# Patient Record
Sex: FEMALE | Race: WHITE | NOT HISPANIC OR LATINO | Employment: OTHER | URBAN - METROPOLITAN AREA
[De-identification: names, ages, dates, MRNs, and addresses within clinical notes are randomized per-mention and may not be internally consistent; named-entity substitution may affect disease eponyms.]

---

## 2018-06-12 ENCOUNTER — OFFICE VISIT CONVERTED (OUTPATIENT)
Dept: NEUROLOGY | Facility: CLINIC | Age: 63
End: 2018-06-12
Attending: PSYCHIATRY & NEUROLOGY

## 2019-10-16 ENCOUNTER — OFFICE VISIT CONVERTED (OUTPATIENT)
Dept: SURGERY | Facility: CLINIC | Age: 64
End: 2019-10-16
Attending: NURSE PRACTITIONER

## 2021-05-15 VITALS — BODY MASS INDEX: 22.26 KG/M2 | WEIGHT: 121 LBS | HEIGHT: 62 IN | RESPIRATION RATE: 12 BRPM

## 2021-05-16 VITALS
HEART RATE: 48 BPM | WEIGHT: 122.5 LBS | DIASTOLIC BLOOD PRESSURE: 51 MMHG | BODY MASS INDEX: 22.54 KG/M2 | HEIGHT: 62 IN | SYSTOLIC BLOOD PRESSURE: 141 MMHG

## 2022-03-10 ENCOUNTER — TRANSCRIBE ORDERS (OUTPATIENT)
Dept: ADMINISTRATIVE | Facility: HOSPITAL | Age: 67
End: 2022-03-10

## 2022-03-10 ENCOUNTER — HOSPITAL ENCOUNTER (OUTPATIENT)
Dept: GENERAL RADIOLOGY | Facility: HOSPITAL | Age: 67
Discharge: HOME | End: 2022-03-10
Admitting: INTERNAL MEDICINE

## 2022-03-10 DIAGNOSIS — M79.643 PAIN OF HAND, UNSPECIFIED LATERALITY: ICD-10-CM

## 2022-03-10 DIAGNOSIS — M25.511 RIGHT SHOULDER PAIN, UNSPECIFIED CHRONICITY: ICD-10-CM

## 2022-03-10 DIAGNOSIS — H20.9 UNSPECIFIED IRIDOCYCLITIS: ICD-10-CM

## 2022-03-10 DIAGNOSIS — H20.9 UNSPECIFIED IRIDOCYCLITIS: Primary | ICD-10-CM

## 2022-03-10 PROCEDURE — 73130 X-RAY EXAM OF HAND: CPT

## 2022-03-10 PROCEDURE — 72202 X-RAY EXAM SI JOINTS 3/> VWS: CPT

## 2022-03-10 PROCEDURE — 73030 X-RAY EXAM OF SHOULDER: CPT

## 2022-07-17 ENCOUNTER — APPOINTMENT (OUTPATIENT)
Dept: GENERAL RADIOLOGY | Facility: HOSPITAL | Age: 67
End: 2022-07-17

## 2022-07-17 ENCOUNTER — HOSPITAL ENCOUNTER (EMERGENCY)
Facility: HOSPITAL | Age: 67
Discharge: HOME | End: 2022-07-17
Attending: EMERGENCY MEDICINE | Admitting: EMERGENCY MEDICINE

## 2022-07-17 VITALS
OXYGEN SATURATION: 96 % | BODY MASS INDEX: 24.67 KG/M2 | HEIGHT: 60 IN | SYSTOLIC BLOOD PRESSURE: 170 MMHG | DIASTOLIC BLOOD PRESSURE: 65 MMHG | HEART RATE: 43 BPM | TEMPERATURE: 97.4 F | WEIGHT: 125.66 LBS | RESPIRATION RATE: 20 BRPM

## 2022-07-17 DIAGNOSIS — U07.1 COVID-19: Primary | ICD-10-CM

## 2022-07-17 DIAGNOSIS — J20.9 ACUTE BRONCHITIS, UNSPECIFIED ORGANISM: ICD-10-CM

## 2022-07-17 LAB
ALBUMIN SERPL-MCNC: 4.5 G/DL (ref 3.5–5.2)
ALBUMIN/GLOB SERPL: 1.5 G/DL
ALP SERPL-CCNC: 79 U/L (ref 39–117)
ALT SERPL W P-5'-P-CCNC: 27 U/L (ref 1–33)
ANION GAP SERPL CALCULATED.3IONS-SCNC: 13.3 MMOL/L (ref 5–15)
ANISOCYTOSIS BLD QL: NORMAL
AST SERPL-CCNC: 25 U/L (ref 1–32)
BASOPHILS # BLD AUTO: 0.02 10*3/MM3 (ref 0–0.2)
BASOPHILS NFR BLD AUTO: 0.2 % (ref 0–1.5)
BILIRUB SERPL-MCNC: 0.2 MG/DL (ref 0–1.2)
BUN SERPL-MCNC: 16 MG/DL (ref 8–23)
BUN/CREAT SERPL: 14.2 (ref 7–25)
CALCIUM SPEC-SCNC: 9.4 MG/DL (ref 8.6–10.5)
CHLORIDE SERPL-SCNC: 99 MMOL/L (ref 98–107)
CO2 SERPL-SCNC: 19.7 MMOL/L (ref 22–29)
CREAT SERPL-MCNC: 1.13 MG/DL (ref 0.57–1)
DEPRECATED RDW RBC AUTO: 54 FL (ref 37–54)
EGFRCR SERPLBLD CKD-EPI 2021: 53.4 ML/MIN/1.73
EOSINOPHIL # BLD AUTO: 0.01 10*3/MM3 (ref 0–0.4)
EOSINOPHIL NFR BLD AUTO: 0.1 % (ref 0.3–6.2)
ERYTHROCYTE [DISTWIDTH] IN BLOOD BY AUTOMATED COUNT: 13.6 % (ref 12.3–15.4)
GLOBULIN UR ELPH-MCNC: 3 GM/DL
GLUCOSE SERPL-MCNC: 111 MG/DL (ref 65–99)
HCT VFR BLD AUTO: 41.6 % (ref 34–46.6)
HGB BLD-MCNC: 13.8 G/DL (ref 12–15.9)
HOLD SPECIMEN: NORMAL
HOLD SPECIMEN: NORMAL
IMM GRANULOCYTES # BLD AUTO: 0.03 10*3/MM3 (ref 0–0.05)
IMM GRANULOCYTES NFR BLD AUTO: 0.3 % (ref 0–0.5)
LYMPHOCYTES # BLD AUTO: 0.89 10*3/MM3 (ref 0.7–3.1)
LYMPHOCYTES NFR BLD AUTO: 9.7 % (ref 19.6–45.3)
MACROCYTES BLD QL SMEAR: NORMAL
MCH RBC QN AUTO: 35.3 PG (ref 26.6–33)
MCHC RBC AUTO-ENTMCNC: 33.2 G/DL (ref 31.5–35.7)
MCV RBC AUTO: 106.4 FL (ref 79–97)
MONOCYTES # BLD AUTO: 0.21 10*3/MM3 (ref 0.1–0.9)
MONOCYTES NFR BLD AUTO: 2.3 % (ref 5–12)
NEUTROPHILS NFR BLD AUTO: 8.03 10*3/MM3 (ref 1.7–7)
NEUTROPHILS NFR BLD AUTO: 87.4 % (ref 42.7–76)
NRBC BLD AUTO-RTO: 0 /100 WBC (ref 0–0.2)
NT-PROBNP SERPL-MCNC: 308.9 PG/ML (ref 0–900)
PLATELET # BLD AUTO: 282 10*3/MM3 (ref 140–450)
PMV BLD AUTO: 9.7 FL (ref 6–12)
POTASSIUM SERPL-SCNC: 4.5 MMOL/L (ref 3.5–5.2)
PROT SERPL-MCNC: 7.5 G/DL (ref 6–8.5)
RBC # BLD AUTO: 3.91 10*6/MM3 (ref 3.77–5.28)
SMALL PLATELETS BLD QL SMEAR: ADEQUATE
SODIUM SERPL-SCNC: 132 MMOL/L (ref 136–145)
TROPONIN T SERPL-MCNC: <0.01 NG/ML (ref 0–0.03)
WBC MORPH BLD: NORMAL
WBC NRBC COR # BLD: 9.19 10*3/MM3 (ref 3.4–10.8)
WHOLE BLOOD HOLD COAG: NORMAL
WHOLE BLOOD HOLD SPECIMEN: NORMAL

## 2022-07-17 PROCEDURE — 80053 COMPREHEN METABOLIC PANEL: CPT

## 2022-07-17 PROCEDURE — 93005 ELECTROCARDIOGRAM TRACING: CPT | Performed by: EMERGENCY MEDICINE

## 2022-07-17 PROCEDURE — 85025 COMPLETE CBC W/AUTO DIFF WBC: CPT

## 2022-07-17 PROCEDURE — 71045 X-RAY EXAM CHEST 1 VIEW: CPT

## 2022-07-17 PROCEDURE — 94799 UNLISTED PULMONARY SVC/PX: CPT

## 2022-07-17 PROCEDURE — 84484 ASSAY OF TROPONIN QUANT: CPT

## 2022-07-17 PROCEDURE — 93005 ELECTROCARDIOGRAM TRACING: CPT

## 2022-07-17 PROCEDURE — 36415 COLL VENOUS BLD VENIPUNCTURE: CPT

## 2022-07-17 PROCEDURE — 96374 THER/PROPH/DIAG INJ IV PUSH: CPT

## 2022-07-17 PROCEDURE — 85007 BL SMEAR W/DIFF WBC COUNT: CPT

## 2022-07-17 PROCEDURE — 83880 ASSAY OF NATRIURETIC PEPTIDE: CPT

## 2022-07-17 PROCEDURE — 99284 EMERGENCY DEPT VISIT MOD MDM: CPT

## 2022-07-17 PROCEDURE — 25010000002 METHYLPREDNISOLONE PER 125 MG: Performed by: EMERGENCY MEDICINE

## 2022-07-17 PROCEDURE — 94640 AIRWAY INHALATION TREATMENT: CPT

## 2022-07-17 PROCEDURE — 93010 ELECTROCARDIOGRAM REPORT: CPT | Performed by: INTERNAL MEDICINE

## 2022-07-17 RX ORDER — ALBUTEROL SULFATE 90 UG/1
2 AEROSOL, METERED RESPIRATORY (INHALATION) EVERY 4 HOURS PRN
Qty: 1 G | Refills: 0 | Status: SHIPPED | OUTPATIENT
Start: 2022-07-17 | End: 2022-08-16

## 2022-07-17 RX ORDER — IPRATROPIUM BROMIDE AND ALBUTEROL SULFATE 2.5; .5 MG/3ML; MG/3ML
3 SOLUTION RESPIRATORY (INHALATION)
Status: COMPLETED | OUTPATIENT
Start: 2022-07-17 | End: 2022-07-17

## 2022-07-17 RX ORDER — LIDOCAINE HYDROCHLORIDE 10 MG/ML
5 INJECTION, SOLUTION EPIDURAL; INFILTRATION; INTRACAUDAL; PERINEURAL ONCE
Status: COMPLETED | OUTPATIENT
Start: 2022-07-17 | End: 2022-07-17

## 2022-07-17 RX ORDER — PREDNISONE 50 MG/1
50 TABLET ORAL DAILY
Qty: 5 TABLET | Refills: 0 | Status: SHIPPED | OUTPATIENT
Start: 2022-07-17 | End: 2022-07-22

## 2022-07-17 RX ORDER — SODIUM CHLORIDE 0.9 % (FLUSH) 0.9 %
10 SYRINGE (ML) INJECTION AS NEEDED
Status: DISCONTINUED | OUTPATIENT
Start: 2022-07-17 | End: 2022-07-18 | Stop reason: HOSPADM

## 2022-07-17 RX ORDER — BENZONATATE 100 MG/1
100 CAPSULE ORAL 3 TIMES DAILY PRN
Qty: 30 CAPSULE | Refills: 0 | Status: SHIPPED | OUTPATIENT
Start: 2022-07-17 | End: 2022-07-27

## 2022-07-17 RX ORDER — METHYLPREDNISOLONE SODIUM SUCCINATE 125 MG/2ML
125 INJECTION, POWDER, LYOPHILIZED, FOR SOLUTION INTRAMUSCULAR; INTRAVENOUS ONCE
Status: COMPLETED | OUTPATIENT
Start: 2022-07-17 | End: 2022-07-17

## 2022-07-17 RX ADMIN — IPRATROPIUM BROMIDE AND ALBUTEROL SULFATE 3 ML: .5; 3 SOLUTION RESPIRATORY (INHALATION) at 19:55

## 2022-07-17 RX ADMIN — METHYLPREDNISOLONE SODIUM SUCCINATE 125 MG: 125 INJECTION, POWDER, FOR SOLUTION INTRAMUSCULAR; INTRAVENOUS at 20:24

## 2022-07-17 RX ADMIN — IPRATROPIUM BROMIDE AND ALBUTEROL SULFATE 3 ML: .5; 3 SOLUTION RESPIRATORY (INHALATION) at 20:05

## 2022-07-17 RX ADMIN — IPRATROPIUM BROMIDE AND ALBUTEROL SULFATE 3 ML: .5; 3 SOLUTION RESPIRATORY (INHALATION) at 20:00

## 2022-07-17 RX ADMIN — LIDOCAINE HYDROCHLORIDE 5 ML: 10 INJECTION, SOLUTION EPIDURAL; INFILTRATION; INTRACAUDAL; PERINEURAL at 20:27

## 2022-07-17 NOTE — ED PROVIDER NOTES
Time: 7:44 PM EDT  Arrived by: car  Chief Complaint: SOB  History provided by: patient  History is limited by: N/A     History of Present Illness:  Patient is a 66 y.o. year old female that presents to the emergency department with SOB. Pt tested positive for COVID on Monday, but has felt sick for about twelve days. Since Monday, pt has been SOB on exertion. Pt has sore throat, headache, denies fever and chills. Pt has neck pain probably due to spondylitis. Pt did have productive cough that has resolved and is now a dry cough. Pt has no history of blood clots. Pt has arthritis and gets injections. She is immunocompromised but is not on any immunotherapy. She has COPD and has a rescue inhaler. She is a smoker, but has not smoked since having symptoms.     HPI    Similar Symptoms Previously: no  Recently seen: no      Patient Care Team  Primary Care Provider: Emily Gusman    Past Medical History:     Allergies   Allergen Reactions   • Penicillins Anaphylaxis   • Sulfa Antibiotics Dizziness   • Losartan Rash     History reviewed. No pertinent past medical history.  History reviewed. No pertinent surgical history.  History reviewed. No pertinent family history.    Home Medications:  Prior to Admission medications    Not on File        Social History:          Review of Systems:  Review of Systems   Constitutional: Negative for chills, diaphoresis and fever.   HENT: Positive for sore throat. Negative for congestion, postnasal drip and rhinorrhea.    Eyes: Negative for photophobia.   Respiratory: Positive for cough and shortness of breath. Negative for chest tightness.    Cardiovascular: Negative for chest pain, palpitations and leg swelling.   Gastrointestinal: Negative for abdominal pain, diarrhea, nausea and vomiting.   Genitourinary: Negative for difficulty urinating, dysuria, flank pain, frequency, hematuria and urgency.   Musculoskeletal: Positive for neck pain. Negative for neck stiffness.        Probably due  "to spondylitis    Skin: Negative for pallor and rash.   Neurological: Positive for headaches. Negative for dizziness, syncope, weakness and numbness.   Hematological: Negative for adenopathy. Does not bruise/bleed easily.   Psychiatric/Behavioral: Negative.         Physical Exam:  /65   Pulse (!) 43   Temp 97.4 °F (36.3 °C) (Oral)   Resp 20   Ht 152.4 cm (60\")   Wt 57 kg (125 lb 10.6 oz)   SpO2 96%   BMI 24.54 kg/m²     Physical Exam  Vitals and nursing note reviewed.   Constitutional:       General: She is not in acute distress.     Appearance: Normal appearance. She is not ill-appearing, toxic-appearing or diaphoretic.   HENT:      Head: Normocephalic and atraumatic.      Mouth/Throat:      Mouth: Mucous membranes are moist.   Eyes:      Pupils: Pupils are equal, round, and reactive to light.      Comments: Scar on R cornea   Cardiovascular:      Rate and Rhythm: Normal rate and regular rhythm.      Pulses: Normal pulses.           Carotid pulses are 2+ on the right side and 2+ on the left side.       Radial pulses are 2+ on the right side and 2+ on the left side.        Femoral pulses are 2+ on the right side and 2+ on the left side.       Popliteal pulses are 2+ on the right side and 2+ on the left side.        Dorsalis pedis pulses are 2+ on the right side and 2+ on the left side.        Posterior tibial pulses are 2+ on the right side and 2+ on the left side.      Heart sounds: Normal heart sounds. No murmur heard.  Pulmonary:      Effort: Pulmonary effort is normal. No accessory muscle usage, respiratory distress or retractions.      Breath sounds: Examination of the right-upper field reveals wheezing. Examination of the left-upper field reveals wheezing. Decreased breath sounds and wheezing present. No rhonchi or rales.      Comments: Crackles on both lung bases  Abdominal:      General: Abdomen is flat. There is no distension.      Palpations: Abdomen is soft. There is no mass.      Tenderness: " There is no abdominal tenderness. There is no right CVA tenderness, left CVA tenderness, guarding or rebound.      Comments: No rigidity   Musculoskeletal:         General: No swelling, tenderness or deformity.      Cervical back: Neck supple. No tenderness.      Right lower leg: No edema.      Left lower leg: No edema.      Comments: Good pulses   Skin:     General: Skin is warm and dry.      Capillary Refill: Capillary refill takes less than 2 seconds.      Coloration: Skin is not jaundiced or pale.      Findings: No erythema.   Neurological:      General: No focal deficit present.      Mental Status: She is alert and oriented to person, place, and time. Mental status is at baseline.      Sensory: No sensory deficit.      Motor: No weakness.   Psychiatric:         Mood and Affect: Mood normal.         Behavior: Behavior normal.                Medications in the Emergency Department:  Medications   ipratropium-albuterol (DUO-NEB) nebulizer solution 3 mL (3 mL Nebulization Given 7/17/22 2005)   lidocaine PF 1% (XYLOCAINE) injection 5 mL (5 mL Nebulization Given 7/17/22 2027)   methylPREDNISolone sodium succinate (SOLU-Medrol) injection 125 mg (125 mg Intravenous Given 7/17/22 2024)        Labs  Lab Results (last 24 hours)     ** No results found for the last 24 hours. **           Imaging:  No Radiology Exams Resulted Within Past 24 Hours    Procedures:  Procedures    Progress                            Medical Decision Making:  MDM  Number of Diagnoses or Management Options  Acute bronchitis, unspecified organism  COVID-19  Diagnosis management comments:     Due to the patient's duration of symptoms the patient is not an appropriate candidate for Paxlovid, the oral treatment for COVID-19    At the time of discharge, the patient appeared well, no distress and nontoxic.  The patient was in no respiratory distress including tachypnea, no accessory muscle use or intercostal retractions.  The patient had a normal room  air pulse ox.  Patient states that she feels comfortable go home.  The patient agrees appropriate for discharge and outpatient follow-up.  The patient was given very specific instructions on when and why to return to emergency room.  The patient voiced understanding of those instructions.  The patient states that she feels comfortable go home and to follow-up with their primary care physician on an outpatient basis.       Amount and/or Complexity of Data Reviewed  Clinical lab tests: reviewed  Tests in the radiology section of CPT®: reviewed  Tests in the medicine section of CPT®: reviewed                 Final diagnoses:   COVID-19   Acute bronchitis, unspecified organism        Disposition:  ED Disposition     ED Disposition   Discharge    Condition   Stable    Comment   --             Documentation assistance provided by Shante Morrison acting as scribe for Ron Hernández DO. Information recorded by the scribe was done at my direction and has been verified and validated by me.        Shante Morrison  07/17/22 2015       Ron Hernández DO  07/22/22 0143

## 2022-07-18 NOTE — DISCHARGE INSTRUCTIONS
Please follow-up with your doctor within 48 hours reevaluation    No strenuous activity until released by your doctor.  Please return to the emergency room for chest pain, radiating chest pain, worsening shortness of breath, near passing out, passing out, extreme fatigue, extreme sweating, nausea or vomiting or new or worrisome symptoms

## 2022-08-07 LAB — QT INTERVAL: 455 MS

## 2023-05-01 PROCEDURE — 88321 CONSLTJ&REPRT SLD PREP ELSWR: CPT | Performed by: INTERNAL MEDICINE

## 2023-05-12 ENCOUNTER — PATIENT OUTREACH (OUTPATIENT)
Dept: ONCOLOGY | Facility: HOSPITAL | Age: 68
End: 2023-05-12
Payer: MEDICARE

## 2023-05-15 ENCOUNTER — PATIENT OUTREACH (OUTPATIENT)
Dept: ONCOLOGY | Facility: HOSPITAL | Age: 68
End: 2023-05-15
Payer: MEDICARE

## 2023-05-16 ENCOUNTER — LAB REQUISITION (OUTPATIENT)
Dept: LAB | Facility: HOSPITAL | Age: 68
End: 2023-05-16
Payer: MEDICARE

## 2023-05-16 ENCOUNTER — PATIENT OUTREACH (OUTPATIENT)
Dept: ONCOLOGY | Facility: HOSPITAL | Age: 68
End: 2023-05-16
Payer: MEDICARE

## 2023-05-16 DIAGNOSIS — C50.919 MALIGNANT NEOPLASM OF UNSPECIFIED SITE OF UNSPECIFIED FEMALE BREAST: ICD-10-CM

## 2023-05-16 LAB
CYTO UR: NORMAL
LAB AP CASE REPORT: NORMAL
LAB AP CLINICAL INFORMATION: NORMAL
LAB AP SPECIAL STAINS: NORMAL
PATH REPORT.FINAL DX SPEC: NORMAL
PATH REPORT.GROSS SPEC: NORMAL

## 2023-05-17 RX ORDER — UREA 10 %
LOTION (ML) TOPICAL
COMMUNITY

## 2023-05-17 RX ORDER — FLUTICASONE PROPIONATE 50 MCG
1 SPRAY, SUSPENSION (ML) NASAL DAILY
COMMUNITY

## 2023-05-17 RX ORDER — ATENOLOL 100 MG/1
TABLET ORAL
COMMUNITY

## 2023-05-17 RX ORDER — ATENOLOL 50 MG/1
50 TABLET ORAL DAILY
COMMUNITY
End: 2023-05-18

## 2023-05-17 RX ORDER — DORZOLAMIDE HCL 20 MG/ML
SOLUTION/ DROPS OPHTHALMIC
COMMUNITY

## 2023-05-17 RX ORDER — MELATONIN
1000 DAILY
COMMUNITY

## 2023-05-17 RX ORDER — LOSARTAN POTASSIUM 100 MG/1
100 TABLET ORAL DAILY
COMMUNITY

## 2023-05-17 RX ORDER — MULTIVITAMIN WITH IRON
TABLET ORAL
COMMUNITY

## 2023-05-17 RX ORDER — LEVOTHYROXINE SODIUM 0.1 MG/1
100 TABLET ORAL DAILY
COMMUNITY

## 2023-05-17 RX ORDER — VERAPAMIL HYDROCHLORIDE 120 MG/1
TABLET, FILM COATED ORAL
COMMUNITY

## 2023-05-17 RX ORDER — LORATADINE 10 MG/1
10 TABLET ORAL DAILY
COMMUNITY

## 2023-05-17 RX ORDER — ATROPINE SULFATE 10 MG/ML
SOLUTION/ DROPS OPHTHALMIC
COMMUNITY

## 2023-05-17 RX ORDER — DIFLUPREDNATE OPHTHALMIC 0.5 MG/ML
1 EMULSION OPHTHALMIC
COMMUNITY

## 2023-05-17 RX ORDER — ESCITALOPRAM OXALATE 20 MG/1
90 TABLET ORAL DAILY
COMMUNITY

## 2023-05-17 RX ORDER — LANOLIN ALCOHOL/MO/W.PET/CERES
400 CREAM (GRAM) TOPICAL DAILY
COMMUNITY

## 2023-05-17 RX ORDER — OMEPRAZOLE 40 MG/1
40 CAPSULE, DELAYED RELEASE ORAL DAILY
COMMUNITY

## 2023-05-17 RX ORDER — ASPIRIN 325 MG
TABLET, DELAYED RELEASE (ENTERIC COATED) ORAL
COMMUNITY

## 2023-05-17 RX ORDER — ROSUVASTATIN CALCIUM 20 MG/1
TABLET, COATED ORAL
COMMUNITY

## 2023-05-17 NOTE — PROGRESS NOTES
Chief Complaint: Breast Cancer    Subjective         History of Present Illness  Arely Huerta is a 67 y.o. female presents to [facility] to be seen for right breast cancer.   Her imaging and pathology are shown below:    Clinical Information    Malignant neoplasm of unspecified site of unspecified female breast   Final Diagnosis   Outside slides for review ([facility], [place], [place]):     Right breast,  8 o'clock position, 2.5 cm from nipple, ultrasound-guided core biopsy ([id number], collected 5/1/2023):  - Invasive carcinoma of no special type (ductal), with apocrine features  - Histologic grade (T[place]k Histologic Score):    - Glandular (Acinar)/Tubular Differentiation:  Score 3  - Nuclear Pleomorphism:  Score 3  - Mitotic Rate:  Score 1  - Overall Grade:  Grade 2               - Size of largest focus of invasion: 2.5 mm               - Breast biomarker testing:                            - Estrogen Receptor (ER):  Positive (greater than 90%, strong)                            - Progesterone Receptor (PgR):  Positive (80%, moderate to strong)                            - HER2 (by FISH): Negative (not amplified), per report  - Ki-67: 15%                      MAMMO DIAGNOSTIC DIGITAL TOMOSYNTHESIS RIGHT W CAD  Order: [id number]  Impression    Further ultrasonographic evaluation recommended, as described above.     ___________________________________     ASSESSMENT CATEGORY:   BIRADS Category 0:  Incomplete.  Need additional imaging evaluation.  A   letter regarding these results will be sent to the patient by the facility   within 30 days.     FOLLOW-UP RECOMMENDATION:   Ultrasound recommended. (I)     Approximately 10% of breast cancers are not detected by mammography.  A   normal mammogram should not delay biopsy of a clinically suspicious   abnormality.     MAMMOGRAPHY - UNILATERAL DIAGNOSTIC:  RIGHT BREAST     REASON FOR EXAM:   Female, 67 years old.  Other  abnormal and inconclusive   findings on diagnostic imaging of breast -- RIGHT BREAST FOCAL ASSYMETRY     PERTINENT HISTORY:  Non-contributory.     TECHNIQUE: Digital examination.  Mediolateral oblique (MLO) and   craniocaudad (CC) views of the breast were obtained. CAD:  CAD was not   performed on this study.     COMPARISON:   4/14/2023   ___________________________________     FINDINGS:   Breast Composition:  The breasts are heterogeneously dense, which may   obscure small masses.     Focal compression views confirm a 2 cm oval obscured high density mass in   the retroareolar right breast at mid depth and ultrasound is recommended   for further evaluation.     No other significant abnormalities are identified.   ___________________________________      Objective     Past Medical History:   Diagnosis Date   • Breast cancer        History reviewed. No pertinent surgical history.      Current Outpatient Medications:   •  amLODIPine (NORVASC) 5 MG tablet, Take 1 tablet by mouth Daily., Disp: , Rfl:   •  aspirin  MG tablet, , Disp: , Rfl:   •  atenolol (TENORMIN) 100 MG tablet, atenolol 100 mg oral tablet take 1 tablet (100 mg) by oral route once daily   Active, Disp: , Rfl:   •  atropine 1 % ophthalmic solution, , Disp: , Rfl:   •  brimonidine (ALPHAGAN) 0.2 % ophthalmic solution, instill 1 drop into right eye twice daily (Patient not taking: Reported on 5/18/2023), Disp: , Rfl:   •  Cetirizine HCl 10 MG capsule, Take  by mouth. (Patient not taking: Reported on 5/18/2023), Disp: , Rfl:   •  cholecalciferol (VITAMIN D3) 25 MCG (1000 UT) tablet, Take 1 tablet by mouth Daily., Disp: , Rfl:   •  Cholecalciferol 50 MCG (2000 UT) capsule, Vitamin D3 2,000 unit oral capsule take 1 capsule by oral route daily   Active (Patient not taking: Reported on 5/18/2023), Disp: , Rfl:   •  Cobalamin Combinations (B12 Folate) 800-800 MCG capsule, Take  by mouth., Disp: , Rfl:   •  cyanocobalamin (VITAMIN B-12) 500 MCG tablet, Take  1 tablet by mouth Daily., Disp: , Rfl:   •  difluprednate (DUREZOL) 0.05 % ophthalmic emulsion, Apply 1 drop to eye(s) as directed by provider., Disp: , Rfl:   •  diphenhydrAMINE (SOMINEX) 25 MG tablet, , Disp: , Rfl:   •  dorzolamide (TRUSOPT) 2 % ophthalmic solution, , Disp: , Rfl:   •  escitalopram (LEXAPRO) 20 MG tablet, Take 4.5 tablets by mouth Daily., Disp: , Rfl:   •  Estradiol 10 % cream, , Disp: , Rfl:   •  fluticasone (FLONASE) 50 MCG/ACT nasal spray, 1 spray into the nostril(s) as directed by provider Daily. (Patient not taking: Reported on 5/18/2023), Disp: , Rfl:   •  folic acid (FOLVITE) 400 MCG tablet, Take 1 tablet by mouth Daily. (Patient not taking: Reported on 5/18/2023), Disp: , Rfl:   •  folic acid (FOLVITE) 800 MCG tablet, folic acid 800 mcg oral tablet take 1 tablet (0.8 mg) by oral route once daily   Active, Disp: , Rfl:   •  ketorolac (ACULAR) 0.5 % ophthalmic solution, INSTILL 1 DROP INTO RIGHT EYE TWICE DAILY STARTING 2 DAYS BEFORE SURGERY AND INCREASE TO FOUR TIMES DAILY AFTER SURGERY AND TAPER, Disp: , Rfl:   •  levothyroxine (SYNTHROID, LEVOTHROID) 100 MCG tablet, Take 1 tablet by mouth Daily. (Patient not taking: Reported on 5/18/2023), Disp: , Rfl:   •  levothyroxine (SYNTHROID, LEVOTHROID) 125 MCG tablet, Take 1 tablet by mouth Every Morning Before Breakfast., Disp: , Rfl:   •  loratadine (CLARITIN) 10 MG tablet, Take 1 tablet by mouth Daily., Disp: , Rfl:   •  losartan (COZAAR) 100 MG tablet, Take 1 tablet by mouth Daily., Disp: , Rfl:   •  moxifloxacin (VIGAMOX) 0.5 % ophthalmic solution, INSTILL 1 DROP INTO RIGHT EYE 4 TIMES DAILY 2 DAYS BEFORE SURGERY, THEN EVERY HOUR WHILE AWAKE DAY OF SURGERY, THEN 4 TIMES DAILY 4 DAYS AFTER SURGERY, THEN STOP (Patient not taking: Reported on 5/18/2023), Disp: , Rfl:   •  omeprazole (priLOSEC) 40 MG capsule, Take 1 capsule by mouth Daily., Disp: , Rfl:   •  POTASSIUM PO, , Disp: , Rfl:   •  prednisoLONE acetate (PRED FORTE) 1 % ophthalmic  "suspension, INSTILL 1 DROP INTO RIGHT EYE 4 TIMES DAILY AS DIRECTED, Disp: , Rfl:   •  predniSONE (DELTASONE) 5 MG tablet, TAKE 4 TABLETS BY MOUTH ONCE DAILY FOR 5 DAYS THEN 3 ONCE DAILY FOR 5 DAYS THEN 2 ONCE DAILY FOR 5 DAYS THEN 1 ONCE DAILY FOR 5 DAYS (Patient not taking: Reported on 5/18/2023), Disp: , Rfl:   •  rosuvastatin (CRESTOR) 20 MG tablet, rosuvastatin 20 mg oral tablet take 1 tablet (20 mg) by oral route once daily   Suspended, Disp: , Rfl:   •  verapamil (CALAN) 120 MG tablet, verapamil 120 mg oral tablet take 1 tablet by oral route daily   Active (Patient not taking: Reported on 5/18/2023), Disp: , Rfl:   •  Vitamin A 2400 MCG (8000 UT) capsule, vitamin A 8,000 unit oral capsule take 1 capsule (8,000 unit) by oral route once daily   Active (Patient not taking: Reported on 5/18/2023), Disp: , Rfl:   •  sodium chloride (ARAMIS 128) 5 % ophthalmic solution, 1 drop As Needed., Disp: , Rfl:     Allergies   Allergen Reactions   • Penicillins Anaphylaxis   • Atorvastatin Unknown - Low Severity     Myalgia   • Doxycycline Unknown - Low Severity     Abdominal pain   • Lisinopril Unknown - Low Severity     cough   • Simvastatin Unknown - Low Severity     Leg cramps   • Sulfa Antibiotics Dizziness   • Losartan Rash        History reviewed. No pertinent family history.    Social History     Socioeconomic History   • Marital status:    Tobacco Use   • Smoking status: Never   • Smokeless tobacco: Never   Vaping Use   • Vaping Use: Never used       Vital Signs:   Resp 16   Ht 152.4 cm (60\")   Wt 56.2 kg (124 lb)   BMI 24.22 kg/m²    Review of Systems    Physical Exam  Vitals and nursing note reviewed.   Constitutional:       Appearance: Normal appearance.   HENT:      Head: Normocephalic and atraumatic.   Eyes:      Extraocular Movements: Extraocular movements intact.      Pupils: Pupils are equal, round, and reactive to light.   Cardiovascular:      Pulses: Normal pulses.   Pulmonary:      Effort: " Pulmonary effort is normal. No accessory muscle usage or respiratory distress.   Chest:   Breasts:     Right: Mass present. No inverted nipple, nipple discharge or skin change.      Left: Normal. No inverted nipple, nipple discharge or skin change.      Comments: Palpable mass in inferior right breast, no LAD  Abdominal:      General: Abdomen is flat.      Palpations: Abdomen is soft.      Tenderness: There is no abdominal tenderness. There is no guarding.   Musculoskeletal:         General: No swelling, tenderness or deformity.      Cervical back: Neck supple.   Lymphadenopathy:      Upper Body:      Right upper body: No supraclavicular or axillary adenopathy.      Left upper body: No supraclavicular or axillary adenopathy.   Skin:     General: Skin is warm and dry.   Neurological:      General: No focal deficit present.      Mental Status: She is alert and oriented to person, place, and time.   Psychiatric:         Mood and Affect: Mood normal.         Thought Content: Thought content normal.          Result Review :               Assessment and Plan    Diagnoses and all orders for this visit:    1. Malignant neoplasm of overlapping sites of right breast in female, estrogen receptor positive (Primary)  -     Ambulatory Referral to Plastic Surgery  -     NM sentinel node injection only; Future    Will plan for needle localized lumpectomy with SLN bx with combination with plastics reduction bilaterally  Will need genetics ( brother with melanoma , sister with breast cancer in 30s, aunt with breast cancer, father with prostate cancer that was aggressive and found in bone at time of diagnosis in early 70s)      Follow Up   Return for Next scheduled followup after surgery.  Patient was given instructions and counseling regarding her condition or for health maintenance advice. Please see specific information pulled into the AVS if appropriate.         This document has been electronically signed by Laura Goff  MD  May 18, 2023 13:58 EDT

## 2023-05-18 ENCOUNTER — PATIENT OUTREACH (OUTPATIENT)
Dept: ONCOLOGY | Facility: HOSPITAL | Age: 68
End: 2023-05-18
Payer: MEDICARE

## 2023-05-18 ENCOUNTER — LAB (OUTPATIENT)
Dept: ONCOLOGY | Facility: HOSPITAL | Age: 68
End: 2023-05-18
Payer: MEDICARE

## 2023-05-18 ENCOUNTER — CONSULT (OUTPATIENT)
Dept: ONCOLOGY | Facility: HOSPITAL | Age: 68
End: 2023-05-18
Payer: MEDICARE

## 2023-05-18 ENCOUNTER — OFFICE VISIT (OUTPATIENT)
Dept: OTHER | Facility: HOSPITAL | Age: 68
End: 2023-05-18
Payer: MEDICARE

## 2023-05-18 ENCOUNTER — DOCUMENTATION (OUTPATIENT)
Dept: ONCOLOGY | Facility: HOSPITAL | Age: 68
End: 2023-05-18
Payer: MEDICARE

## 2023-05-18 VITALS — HEIGHT: 60 IN | RESPIRATION RATE: 16 BRPM | BODY MASS INDEX: 24.35 KG/M2 | WEIGHT: 124 LBS

## 2023-05-18 VITALS
HEART RATE: 48 BPM | RESPIRATION RATE: 18 BRPM | SYSTOLIC BLOOD PRESSURE: 196 MMHG | TEMPERATURE: 97.7 F | DIASTOLIC BLOOD PRESSURE: 74 MMHG | OXYGEN SATURATION: 98 %

## 2023-05-18 DIAGNOSIS — C50.811 MALIGNANT NEOPLASM OF OVERLAPPING SITES OF RIGHT BREAST IN FEMALE, ESTROGEN RECEPTOR POSITIVE: Primary | ICD-10-CM

## 2023-05-18 DIAGNOSIS — Z17.0 MALIGNANT NEOPLASM OF OVERLAPPING SITES OF RIGHT BREAST IN FEMALE, ESTROGEN RECEPTOR POSITIVE: Primary | ICD-10-CM

## 2023-05-18 PROCEDURE — 1159F MED LIST DOCD IN RCRD: CPT | Performed by: SURGERY

## 2023-05-18 PROCEDURE — 1160F RVW MEDS BY RX/DR IN RCRD: CPT | Performed by: SURGERY

## 2023-05-18 RX ORDER — PREDNISONE 5 MG/1
TABLET ORAL
COMMUNITY
Start: 2023-02-06

## 2023-05-18 RX ORDER — SILICONE ADHESIVE 1.5" X 3"
1 SHEET (EA) TOPICAL AS NEEDED
COMMUNITY

## 2023-05-18 RX ORDER — AMLODIPINE BESYLATE 5 MG/1
1 TABLET ORAL DAILY
COMMUNITY
Start: 2023-04-06

## 2023-05-18 RX ORDER — BRIMONIDINE TARTRATE 2 MG/ML
SOLUTION/ DROPS OPHTHALMIC
COMMUNITY
Start: 2023-05-06

## 2023-05-18 RX ORDER — KETOROLAC TROMETHAMINE 5 MG/ML
SOLUTION OPHTHALMIC
COMMUNITY
Start: 2023-04-20

## 2023-05-18 RX ORDER — MOXIFLOXACIN 5 MG/ML
SOLUTION/ DROPS OPHTHALMIC
COMMUNITY
Start: 2023-04-20

## 2023-05-18 RX ORDER — PREDNISOLONE ACETATE 10 MG/ML
SUSPENSION/ DROPS OPHTHALMIC
COMMUNITY
Start: 2023-05-13

## 2023-05-18 RX ORDER — LEVOTHYROXINE SODIUM 0.12 MG/1
125 TABLET ORAL
COMMUNITY
Start: 2023-03-17

## 2023-05-18 NOTE — PROGRESS NOTES
Diagnosis: Breast cancer    Reason for Referral: Multidisciplinary breast clinic    Current Tx Plan: Mrs. Huerta will have genetic testing performed. She will undergo breast lumpectomy followed by adjuvant radiation therapy and aromatase inhibitor.     Most Recent Distress Level: 3    Mental Status: Mrs. Huerta was very pleasant, alert and oriented x 3. She was easily engaged in conversation and was able to effectively communicate her thoughts and feelings. She disclosed feelings of worry or anxiety on her distress assessment today. When asked how she's been coping with her diagnosis, she reports suprisingly well, however, hasn't yet cried. She is currently taking Lexapro managed by her PCP and reports being on this for approximately 9 years or so since she moved to [place] from [place]. Provided emotional support throughout, utilizing empathy and validating and normalizing these identified emotions after receiving a cancer diagnosis. Discussed opportunity to get Mrs. Huerta connected to outpatient mental health services (counseling, psychiatry) and/or cancer support services ([id number] support, support groups) if emotions persist, increase or begin disrupting her daily activities. No SI/HI reported or indicated today    Mental Health Treatment: Mrs. Huerta denies any history of mental health treatment, however, has been prescribed Lexapro by PCP for approximately 9 years    Support System: Mrs. Huerta receives support from her spouse who is also a cancer survivor and in remission, her mother-in-law who is present with her today, [employer], friends, etc. She reports all of her family resides in [place] and [place]. She has two children.    Spirituality: Mrs. Huerta reports having strong spiritual support and actively attends Confucianist services. She acknowledges God's presence in her life and notes that he walks with her and will carry her through her treatment.     Hobbies: Mrs. Huerta enjoys spending time with her  animals (dogs, cats, chickens, etc).     Residential status/Who lives in the home: Mrs. Huerta resides in her home with her spouse in [facility]. She has lived in [place] for about 9 years after moving here from [place]. Her mother-in-law lives right down the road from them.     Home Care Needs: No needs identified at this time. She is independent with her ADLs.    Insurance: Medicare and [place] [employer] supplement    Finances: Mrs. Huerta reports she is retired, however, her spouse is employed. She denies any financial concerns with affording medical expenses and/or meeting basic needs at this time. Educated Mrs. Huerta that OSW support remains available if her financial situation changes.    Transportation: Mrs. Huerta provides her own transportation, however, her mother-in-law has been attending all of her medical appointments with her. She denies any transportation barriers and has no concerns with affording travel expenses to radiation therapy treatments.    Advance Care Planning: No directive on file    Resources/Referrals: Emotional support    Additional Comment: OSW met with Mrs. Huerta and her mother-in-law in the medical oncology multidisciplinary breast clinic to introduce OSW role and assess for psychosocial needs. Educated Mrs. Huerta on the support services that could be accessed to address physical, emotional, social, practical and spiritual needs. She respectfully declined any resources or referrals at this time. Educated Mrs. Huerta that OSW support is available in the radiation oncology department as well. Provided her with my business card, encouraging OSW support remains available. She expressed gratitude for the team's kindness today.

## 2023-05-18 NOTE — PROGRESS NOTES
Chief Complaint  Malignant neoplasm of overlapping sites of right breast in     Emily Olivo MD Camas, Jennifer, MD    Subjective          Arely Huerta presents to [facility] for right sided breast cancer.    Ms. Huerta is a very pleasant 68 yo female who presents due to recent diagnosis of right sided breast cancer. She had abnormal screening mammogram. She did not feel any breast lump or have any breast changes. No swollen lymph nodes. Mammogram was followed by ultrasound showing 1.8 cm lesion of right breast that was biopsied on 5/1/23. This revealed a ER (90%), OK (80%), HER2 negative by FISH breast cancer. She has some residual breast pain in right breast from biopsy but otherwise is doing well.  Recently had right eye surgery and still has blurred vision from this. She was never on hormonal therapy. No history of being on birth control. 2 children with first at age 24. She has family history of breast cancer in 2 relatives.     Oncology/Hematology History Overview Note   4/18/23: Breast ultrasound done due to abnormal screening mammogram. Ultrasound demonstrated 1.8 cm lesion of right breast. Biopsy recommended    5/1/23: U/s guided biopsy of right breast lesion 8 o'clock, 2.5 cm from nipple: Positive for invasive mammary carcinoma, ductal with apocrine features, G2, 2.5 mm invasion, no LVI/PNI, ER positive at 90%, OK positive at 80%, HER2/leigh ann by FISH negative, Ki67 of 15%, p53 10% positive.      Malignant neoplasm of overlapping sites of right breast in female, estrogen receptor positive   5/18/2023 Initial Diagnosis    Malignant neoplasm of overlapping sites of right breast in female, estrogen receptor positive           Review of Systems   Constitutional: Positive for fatigue.   Eyes: Positive for blurred vision.   Genitourinary: Positive for breast pain (Acute pain from biopsy ).   All other systems reviewed and are negative.    Current Outpatient Medications  on File Prior to Visit   Medication Sig Dispense Refill   • amLODIPine (NORVASC) 5 MG tablet Take 1 tablet by mouth Daily.     • aspirin  MG tablet      • atenolol (TENORMIN) 100 MG tablet atenolol 100 mg oral tablet take 1 tablet (100 mg) by oral route once daily   Active     • atropine 1 % ophthalmic solution      • brimonidine (ALPHAGAN) 0.2 % ophthalmic solution instill 1 drop into right eye twice daily     • Cetirizine HCl 10 MG capsule Take  by mouth.     • cholecalciferol (VITAMIN D3) 25 MCG (1000 UT) tablet Take 1 tablet by mouth Daily.     • Cholecalciferol 50 MCG (2000 UT) capsule Vitamin D3 2,000 unit oral capsule take 1 capsule by oral route daily   Active     • Cobalamin Combinations (B12 Folate) 800-800 MCG capsule Take  by mouth.     • cyanocobalamin (VITAMIN B-12) 500 MCG tablet Take 1 tablet by mouth Daily.     • difluprednate (DUREZOL) 0.05 % ophthalmic emulsion Apply 1 drop to eye(s) as directed by provider.     • diphenhydrAMINE (SOMINEX) 25 MG tablet      • dorzolamide (TRUSOPT) 2 % ophthalmic solution      • escitalopram (LEXAPRO) 20 MG tablet Take 4.5 tablets by mouth Daily.     • Estradiol 10 % cream      • fluticasone (FLONASE) 50 MCG/ACT nasal spray 1 spray into the nostril(s) as directed by provider Daily.     • folic acid (FOLVITE) 400 MCG tablet Take 1 tablet by mouth Daily.     • folic acid (FOLVITE) 800 MCG tablet folic acid 800 mcg oral tablet take 1 tablet (0.8 mg) by oral route once daily   Active     • ketorolac (ACULAR) 0.5 % ophthalmic solution INSTILL 1 DROP INTO RIGHT EYE TWICE DAILY STARTING 2 DAYS BEFORE SURGERY AND INCREASE TO FOUR TIMES DAILY AFTER SURGERY AND TAPER     • levothyroxine (SYNTHROID, LEVOTHROID) 100 MCG tablet Take 1 tablet by mouth Daily.     • levothyroxine (SYNTHROID, LEVOTHROID) 125 MCG tablet Take 1 tablet by mouth Every Morning Before Breakfast.     • loratadine (CLARITIN) 10 MG tablet Take 1 tablet by mouth Daily.     • losartan (COZAAR) 100 MG  tablet Take 1 tablet by mouth Daily.     • moxifloxacin (VIGAMOX) 0.5 % ophthalmic solution INSTILL 1 DROP INTO RIGHT EYE 4 TIMES DAILY 2 DAYS BEFORE SURGERY, THEN EVERY HOUR WHILE AWAKE DAY OF SURGERY, THEN 4 TIMES DAILY 4 DAYS AFTER SURGERY, THEN STOP     • omeprazole (priLOSEC) 40 MG capsule Take 1 capsule by mouth Daily.     • POTASSIUM PO      • prednisoLONE acetate (PRED FORTE) 1 % ophthalmic suspension INSTILL 1 DROP INTO RIGHT EYE 4 TIMES DAILY AS DIRECTED     • predniSONE (DELTASONE) 5 MG tablet TAKE 4 TABLETS BY MOUTH ONCE DAILY FOR 5 DAYS THEN 3 ONCE DAILY FOR 5 DAYS THEN 2 ONCE DAILY FOR 5 DAYS THEN 1 ONCE DAILY FOR 5 DAYS     • rosuvastatin (CRESTOR) 20 MG tablet rosuvastatin 20 mg oral tablet take 1 tablet (20 mg) by oral route once daily   Suspended     • verapamil (CALAN) 120 MG tablet verapamil 120 mg oral tablet take 1 tablet by oral route daily   Active     • Vitamin A 2400 MCG (8000 UT) capsule vitamin A 8,000 unit oral capsule take 1 capsule (8,000 unit) by oral route once daily   Active     • [DISCONTINUED] atenolol (TENORMIN) 50 MG tablet Take 1 tablet by mouth Daily.       No current facility-administered medications on file prior to visit.       Allergies   Allergen Reactions   • Penicillins Anaphylaxis   • Atorvastatin Unknown - Low Severity     Myalgia   • Doxycycline Unknown - Low Severity     Abdominal pain   • Lisinopril Unknown - Low Severity     cough   • Simvastatin Unknown - Low Severity     Leg cramps   • Sulfa Antibiotics Dizziness   • Losartan Rash     Past Medical History:   Diagnosis Date   • Breast cancer      No past surgical history on file.  Social History     Socioeconomic History   • Marital status:    Tobacco Use   • Smoking status: Never   • Smokeless tobacco: Never   Vaping Use   • Vaping Use: Never used     Family History   Problem Relation Age of Onset   • Melanoma Father    • Breast cancer Sister        Objective   Physical Exam  Well appearing patient in no  acute distress on RA  + right eye conjunctival injection, no rash on exposed skin  Respirations non-labored  Awake, alert, and oriented x 4. Speech intact. No gross neurologic deficit  Appropriate mood and affect      Vitals:    05/18/23 1256   BP: (!) 196/74   Pulse: (!) 48   Resp: 18   Temp: 97.7 °F (36.5 °C)   TempSrc: Temporal   SpO2: 98%   PainSc:   3   PainLoc: Breast     ECOG score: 0         PHQ-9 Total Score:                      Result Review :   The following data was reviewed by: David Vazquez MD on 05/18/23:  Lab Results   Component Value Date    HGB 13.8 07/17/2022    HCT 41.6 07/17/2022    .4 (H) 07/17/2022     07/17/2022    WBC 9.19 07/17/2022    NEUTROABS 8.03 (H) 07/17/2022    LYMPHSABS 0.89 07/17/2022    MONOSABS 0.21 07/17/2022    EOSABS 0.01 07/17/2022    BASOSABS 0.02 07/17/2022     Lab Results   Component Value Date    GLUCOSE 111 (H) 07/17/2022    BUN 16 07/17/2022    CREATININE 1.13 (H) 07/17/2022     (L) 07/17/2022    K 4.5 07/17/2022    CL 99 07/17/2022    CO2 19.7 (L) 07/17/2022    CALCIUM 9.4 07/17/2022    PROTEINTOT 7.5 07/17/2022    ALBUMIN 4.50 07/17/2022    BILITOT 0.2 07/17/2022    ALKPHOS 79 07/17/2022    AST 25 07/17/2022    ALT 27 07/17/2022     No results found for: MG, PHOS, FREET4, TSH   Labs personally reviewed         MAMMO DIAGNOSTIC DIGITAL TOMOSYNTHESIS RIGHT W CAD    Result Date: 5/1/2023  Interval ultrasound-guided vacuum-assisted core biopsy of mass in the lower outer quadrant right breast with a marking clip. ___________________________________ Approximately 10% of breast cancers are not detected by mammography.  A normal mammogram should not delay biopsy of a clinically suspicious abnormality. Electronically Signed: Gagan Durand MD 2023/05/01 at 9:50 CDT Reading Location ID and State: [address] / [place] Tel [phone], Service support  [phone], Fax [phone]    MAMMO DIAGNOSTIC DIGITAL TOMOSYNTHESIS RIGHT W CAD    Result Date:  4/18/2023  Further ultrasonographic evaluation recommended, as described above. ___________________________________ ASSESSMENT CATEGORY: BIRADS Category 0:  Incomplete.  Need additional imaging evaluation.  A letter regarding these results will be sent to the patient by the facility within 30 days. FOLLOW-UP RECOMMENDATION: Ultrasound recommended. (I) Approximately 10% of breast cancers are not detected by mammography.  A normal mammogram should not delay biopsy of a clinically suspicious abnormality. Electronically Signed: Gagan Durand MD 2023/04/18 at 13:25 CDT Reading Location ID and State: [address] / [place] Tel [phone], Service support  [phone], Fax [phone]    Ultrasound guided breast biopsy right w/ vacuum    Result Date: 5/1/2023  Ultrasound guided core biopsy of a mass in the RIGHT breast at 8:00 without complication. An addendum to this report will be rendered with appropriate recommendations when the pathology report is finalized. Electronically Signed: Gagan Durand MD 2023/05/01 at 14:21 CDT Reading Location ID and State: [address] / [place] Tel [phone], Service support  [phone], Fax [phone]    US Breast Right Limited    Result Date: 4/18/2023  Ultrasound confirms a 1.8 cm hypoechoic mass and ultrasound-guided vacuum-assisted core biopsy is recommended. ___________________________________ ASSESSMENT CATEGORY: BIRADS Category 5:  Highly Suggestive of Malignancy - Appropriate Action Should Be Taken.  A letter regarding these results will be sent to the patient by the facility within 30 days. Electronically Signed: Gagan Durand MD 2023/04/18 at 13:39 CDT Reading Location ID and State: [address] / [place] Tel [phone], Service support  [phone], Fax [phone]          Assessment and Plan    Diagnoses and all orders for this visit:    1. Malignant neoplasm of overlapping sites of right breast in female, estrogen receptor positive (Primary)  -     Ambulatory Referral to Genetic  Counseling/Testing    ER/MO positive right breast cancer  Clinical T1c (1.8 cm) ER (90%) and MO (80%) positive right breast cancer with HER2 negative by FISH. Clinically lymph node negative. Management personally discussed with Dr. Goff, surgeon. Plan for lumpectomy followed by radiation. Due to hormone positivity and post-menopausal status, recommendation will be for at least 5 years of adjuvant aromatase inhibitor. Will need repeat DEXA done after surgery.  OncotypeDx recurrent risk score to be sent on tumor and chemotherapy would only be recommended as adjuvant treatment if high risk score returns.     Due to family history of 2 relatives with breast cancer, genetic testing performed today. Plan to follow up results.      This is an acute or chronic illness that poses a threat to life or bodily function. The above treatment plan involves a high risk of complications and/or mortality of patient management.    The patient was seen and examined. Work by the provider also included review and/or ordering of lab tests, review and/or ordering of radiology tests, review and/or ordering of medicine tests, discussion with other physicians or providers, independent review of data, obtaining old records, review/summation of old records, and/or other review.     I have reviewed the family history, social history, and past medical history for this patient. Previous information and data has been reviewed and updated as needed. I have reviewed and verified the chief complaint, history, and other documentation. The patient was interviewed and examined at the bedside and the chart reviewed. The previous observations, recommendations, and conclusions were reviewed including those of other providers.     The plan was discussed with the patient and/or family. The patient was given time to ask questions and these questions were answered. At the conclusion of their visit they had no additional questions or concerns and all  questions were answered to their satisfaction.          Patient Follow Up:   Patient was given instructions and counseling regarding her condition or for health maintenance advice. Please see specific information pulled into the AVS if appropriate.

## 2023-05-19 ENCOUNTER — PATIENT OUTREACH (OUTPATIENT)
Dept: ONCOLOGY | Facility: HOSPITAL | Age: 68
End: 2023-05-19

## 2023-05-19 DIAGNOSIS — C50.911 MALIGNANT NEOPLASM OF RIGHT FEMALE BREAST, UNSPECIFIED ESTROGEN RECEPTOR STATUS, UNSPECIFIED SITE OF BREAST: Primary | ICD-10-CM

## 2023-05-24 NOTE — PROGRESS NOTES
"Consult (Breast Recon combo with Dr. Goff)            History of Present Illness  Arely Huerta is a 67 y.o. female who presents to [facility] as a consult for possible combo case with Dr. Goff on 6/20/23 and to discuss breast reconstructive options.  She wears 36C bra size and would like to be 36C. Is going to have radiation.    Maternal sister and sister had breast cancer. Waiting on results of genetics testing before deciding on surgery. Smoker but is working on stopping and will quite today. Takes aspirin daily. Cardiologist (Charlotte FELDER at [facility]). See Rheumatology (Dr. Zavaleta) in [place].     Subjective      Penicillins, Atorvastatin, Doxycycline, Lisinopril, Simvastatin, Sulfa antibiotics, and Losartan  Allergies Reconciled.    Review of Systems  All system were reviewed and were negative, except the ones noted above.     @Objective     /78 (BP Location: Left arm, Patient Position: Sitting)   Pulse (!) 48   Temp 98.4 °F (36.9 °C) (Temporal)   Ht 152.4 cm (60\")   Wt 56.1 kg (123 lb 9.6 oz)   SpO2 97%   BMI 24.14 kg/m²     Body mass index is 24.14 kg/m².    Physical Exam    Patient awake, alert, oriented  Respirations are non elaborated  Patient is not tachycardic    Breast exam:     Bilateral breast with grade 3 nipple ptosis, no palpable masses or tenderness   Axillary Lymphadenopathy: No axillary lymphadenopathy  SN-N (Right Breast): 26  SN-N (Left Breast): 25  N-IMF (Right Breast): 6  N-IMF (Left Breast): 7  Base Width (Right Breast): 10  Base Width (Left Breast): 10      Result Review :       Assessment and Plan      Diagnoses and all orders for this visit:    1. Malignant neoplasm of overlapping sites of right breast in female, estrogen receptor positive (Primary)        Additional Order(s):       Plan:    • Breast reconstruction options (Tissue Expander/Implant versus Autologous) were all discussed with the patient " at length.  In addition, we discussed options for symmetry procedures and nipple reconstruction.  The patient understands that her reconstructed breast will not have the same sensation as a natural breast and that visible incision lines will always be present.  In addition, patient understand that breast reconstruction is a multi-stage procedure that can take months to years to complete.   • After thorough discussion of risk and benefits of each procedure the patient has elected to proceed.   • We will send information for prior authorization.  Photos were obtained.   • Patient was given the breast reconstruction pamphlet as well as directed to the ASPS website for additional information.   • The patient was made aware that the FDA has discovered rare occurrences between an uncommon form of lymphoma (anaplastic large cell) and women with breast implants.  While the incidence is quite low, the risk of development is real; therefore, any unusual symptoms or signs (most commonly a late fluid collection years later) should be brought to the attention of your physician.  Patient also counseled on risks and benefits of saline versus silicone implants, lifting restrictions, scar placement, risk of capsular contracture, animation deformity, need for likely revision of breast implants at some point in her life, FDA recommendation of MRI every three years to monitor for rupture, and continued mammography for breast cancer surveillance.   • We had a long discussion with the patient and her family today regarding her reconstructive options.  These include no reconstruction, reconstruction at the time of mastectomy or lumpectomy, and finally delayed reconstruction.  We discussed specific types of reconstruction, including: tissue expander and/or implants, and autologous reconstruction with either pedicled or free flap.  We also covered the later stages of reconstruction, including nipple reconstruction and areola tattooing, fat  grafting, and symmetry procedures if indicated or desired.   • I described the typical christina-operative course of each procedure, including the nature of the procedure, hospital stay, necessity for drains, post-operative activity restriction, and postoperative visits (including those for tissue expansion).  We also discussed the time frame for reconstruction.  I shared information about saline vs. silicone implants, including their differences, risk of capsular contracture, rippling, or leak/rupture, and safety/monitoring issues.  I described Alloderm and its use for implant reconstruction. We discussed that radiation therapy, if she ultimately requires it, may alter the reconstructive options.     • We reviewed surgical risks including, but not limited to, infection, bleeding, hematoma, seroma, pain, scarring, numbness, skin or nipple loss, wound healing problems, flap failures including partial or complete flap loss, asymmetry, need for revisions or further surgeries,  and risks associated with anesthesia.   • Additional risks with autologous reconstruction include donor wound morbidities, such as hernias or bulges, wound healing problems, muscle weakness, partial or complete flap loss, and typical surgical risks as listed above.   • Patient is planning to have right lumpectomy and would like to have breast reconstruction bilaterally so that breast are symmetrical. She consents to left reduction/mastopexy and right oncoplastic closure with mastpexy. Discussed only a small amount of tissue may need to be removed from left to make it symmetrical to lumpectomy side.         We will wait on genetics testing results. Cotinine order placed. Patient will call us once she stops smoking and is aware of our policy. Patient will take cotinine test when she is 2 weeks nicotine free which will be June 15th. Patient states she will stop smoking today. We will call patient once we get nicotine results to schedule combo case with  Dr. Goff. Patient is aware surgery will be canceled if results are positive.         Consent:  Bilateral breast reconstruction, left breast reduction, right breast oncoplastic closure with mastopexy    • She states she will stop smoking now. I counseled the patient to stop smoking before surgery in order to avoid complications such as wound healing problems and infection.  We discussed smoking cessation. I counseled the patient to be nicotine free because the nicotine is what causes vasoconstriction of the blood vessels. Will need to be nicotine free 2 weeks before and 4 weeks after surgery.   • Target implant size: 600 cc    CPT codes:   • 51824  • 97135          Scribed by Randi Young MA, acting as a scribe for EMERITA Higuera, 06/01/23 11:04 EDT.  EMERITA Higuera's signature on the note affirms that the note adequately documents the care provided.      Patient was given instructions and counseling regarding her condition. Please see specific information pulled into the AVS if appropriate.     Randi Young MA  06/01/2023

## 2023-06-01 ENCOUNTER — PREP FOR SURGERY (OUTPATIENT)
Dept: OTHER | Facility: HOSPITAL | Age: 68
End: 2023-06-01

## 2023-06-01 ENCOUNTER — OFFICE VISIT (OUTPATIENT)
Dept: PLASTIC SURGERY | Facility: CLINIC | Age: 68
End: 2023-06-01

## 2023-06-01 VITALS
TEMPERATURE: 98.4 F | WEIGHT: 123.6 LBS | DIASTOLIC BLOOD PRESSURE: 78 MMHG | SYSTOLIC BLOOD PRESSURE: 176 MMHG | HEIGHT: 60 IN | OXYGEN SATURATION: 97 % | BODY MASS INDEX: 24.26 KG/M2 | HEART RATE: 48 BPM

## 2023-06-01 DIAGNOSIS — Z72.0 TOBACCO ABUSE: ICD-10-CM

## 2023-06-01 DIAGNOSIS — Z17.0 MALIGNANT NEOPLASM OF OVERLAPPING SITES OF RIGHT BREAST IN FEMALE, ESTROGEN RECEPTOR POSITIVE: Primary | ICD-10-CM

## 2023-06-01 DIAGNOSIS — C50.811 MALIGNANT NEOPLASM OF OVERLAPPING SITES OF RIGHT BREAST IN FEMALE, ESTROGEN RECEPTOR POSITIVE: Primary | ICD-10-CM

## 2023-06-08 ENCOUNTER — TELEPHONE (OUTPATIENT)
Dept: OCCUPATIONAL THERAPY | Facility: HOSPITAL | Age: 68
End: 2023-06-08
Payer: COMMERCIAL

## 2023-06-08 DIAGNOSIS — Z17.0 MALIGNANT NEOPLASM OF OVERLAPPING SITES OF RIGHT BREAST IN FEMALE, ESTROGEN RECEPTOR POSITIVE: Primary | ICD-10-CM

## 2023-06-08 DIAGNOSIS — C50.811 MALIGNANT NEOPLASM OF OVERLAPPING SITES OF RIGHT BREAST IN FEMALE, ESTROGEN RECEPTOR POSITIVE: Primary | ICD-10-CM

## 2023-06-08 DIAGNOSIS — Z91.89 AT RISK FOR LYMPHEDEMA: ICD-10-CM

## 2023-06-08 DIAGNOSIS — Z01.818 ENCOUNTER FOR PREOPERATIVE ASSESSMENT: ICD-10-CM

## 2023-06-09 ENCOUNTER — PATIENT ROUNDING (BHMG ONLY) (OUTPATIENT)
Dept: PLASTIC SURGERY | Facility: CLINIC | Age: 68
End: 2023-06-09
Payer: COMMERCIAL

## 2023-06-09 NOTE — PROGRESS NOTES
06/09/23    My name is MARCEL Torres with [facility].    I want to officially welcome you to our practice and ask about your recent visit.       If you could please tell me about your recent visit with us. What things went well?   Great, surgeon was wonderful and so was the Nurse Practitioner, they were very informative, and how I was treated made me very happy.       We're always looking for ways to make our patients' experiences even better. Do you have recommendations on ways we may improve?  No, I didn't have to wait long, staff is doing great.     Overall were you satisfied with your first visit to our practice?  Definitely.     I appreciate you taking the time to answer a few questions today.      Please note that you may also receive a survey related to your visit, should you receive that we ask that you please complete it and return it so that we can monitor how we are doing with overall patient care.     Thank you and have a great day.    MARCEL Torres

## 2023-06-14 ENCOUNTER — HOSPITAL ENCOUNTER (OUTPATIENT)
Dept: OCCUPATIONAL THERAPY | Facility: HOSPITAL | Age: 68
Discharge: HOME | End: 2023-06-14
Admitting: SURGERY
Payer: MEDICARE

## 2023-06-14 DIAGNOSIS — Z91.89 AT RISK FOR LYMPHEDEMA: Primary | Chronic | ICD-10-CM

## 2023-06-14 DIAGNOSIS — Z17.0 MALIGNANT NEOPLASM OF UPPER-OUTER QUADRANT OF RIGHT BREAST IN FEMALE, ESTROGEN RECEPTOR POSITIVE: Chronic | ICD-10-CM

## 2023-06-14 DIAGNOSIS — C50.411 MALIGNANT NEOPLASM OF UPPER-OUTER QUADRANT OF RIGHT BREAST IN FEMALE, ESTROGEN RECEPTOR POSITIVE: Chronic | ICD-10-CM

## 2023-06-14 DIAGNOSIS — Z01.818 ENCOUNTER FOR PREOPERATIVE ASSESSMENT: Chronic | ICD-10-CM

## 2023-06-14 PROCEDURE — 93702 BIS XTRACELL FLUID ANALYSIS: CPT | Performed by: OCCUPATIONAL THERAPIST

## 2023-06-14 PROCEDURE — 97165 OT EVAL LOW COMPLEX 30 MIN: CPT | Performed by: OCCUPATIONAL THERAPIST

## 2023-06-14 NOTE — THERAPY EVALUATION
Outpatient Occupational Therapy Lymphedema Initial Evaluation   Jesu     Patient Name: Arely Huerta  : 1955  MRN: [id number]  Today's Date: 2023      Visit Date: 2023    Patient Active Problem List   Diagnosis    Malignant neoplasm of overlapping sites of right breast in female, estrogen receptor positive        Past Medical History:   Diagnosis Date    Acid reflux     Allergies     Ankylosing spondylitis     Breast cancer     Hypercholesteremia     Hypertension     Thyroid condition         Past Surgical History:   Procedure Laterality Date    APPENDECTOMY      CORNEAL TRANSPLANT Right     FOOT SURGERY      SINUS SURGERY      x6    TUBAL ABDOMINAL LIGATION           Visit Dx:     ICD-10-CM ICD-9-CM   1. At risk for lymphedema  Z91.89 V49.89   2. Encounter for preoperative assessment  Z01.818 V72.84   3. Malignant neoplasm of upper-outer quadrant of right breast in female, estrogen receptor positive  C50.411 174.4    Z17.0 V86.0        Patient History       Row Name 23 1100             History    Chief Complaint --  Lymphedema Surveillance Program  -TD      Brief Description of Current Complaint Arely is a 67 y.o. female with a diagnosis of invasive ductal carcinoma ER/SD + HER2- of the right breast.  Pt will undergo a lumpectomy on 23.  -TD         Fall Risk Assessment    Any falls in the past year: No  -TD      Does patient have a fear of falling No  -TD         Services    Are you currently receiving Home Health services No  -TD      Do you plan to receive Home Health services in the near future No  -TD         Daily Activities    Primary Language English  -TD      Are you able to read Yes  -TD      Are you able to write Yes  -TD      How does patient learn best? Listening;Reading;Demonstration;Pictures/Video  -TD         Safety    Are you being hurt, hit, or frightened by anyone at home or in your life? No  -TD      Are you being neglected by a caregiver No  -TD      Have you  "had any of the following issues with N/A  -TD                User Key  (r) = Recorded By, (t) = Taken By, (c) = Cosigned By      Initials Name Provider Type    Gail Whitfield OT Occupational Therapist                     Lymphedema       Row Name 06/14/23 1100             Subjective Pain    Able to rate subjective pain? yes  -TD      Pre-Treatment Pain Level 0  -TD      Post-Treatment Pain Level 0  -TD         Subjective Comments    Subjective Comments Pt is nervous about surgery  -TD         Lymphedema Assessment    Lymphedema Classification RUE:;at risk/stage 0  -TD      Lymphedema Cancer Related Sx right;lumpectomy;simple mastectomy  -TD      Lymphedema Surgery Comments 6/20/2023  -TD         LLIS - Physical Concerns    The amount of pain associated with my lymphedema is: 0  -TD      The amount of limb heaviness associated with my lymphedema is: 0  -TD      The amount of skin tightness associated with my lymphedema is: 0  -TD      The size of my swollen limb(s) seems: 0  -TD      Lymphedema affects the movement of my swollen limb(s): 0  -TD      The strength in my swollen limb(s) is: 0  -TD         LLIS - Psychosocial Concerns    Lymphedema affects my body image (i.e., \"how I think I look\"). 0  -TD      Lymphedema affects my socializing with others. 0  -TD      Lymphedema affects my intimate relations with spouse or partner (rate 0 if not applicable 0  -TD      Lymphedema \"gets me down\" (i.e., depression, frustration, or anger) 0  -TD      I must rely on others for help due to my lymphedema. 0  -TD      I know what to do to manage my lymphedema 4  -TD         LLIS - Functional Concerns    Lymphedema affects my ability to perform self-care activities (i.e. eating, dressing, hygiene) 0  -TD      Lymphedema affects my ability to perform routine home or work-related activities. 0  -TD      Lymphedema affects my performance of preferred leisure activities. 0  -TD      Lymphedema affects proper fit of clothing/shoes " 0  -TD      Lymphedema affects my sleep 0  -TD         Posture/Observations    Posture- WNL Posture is WNL  -TD         General ROM    GENERAL ROM COMMENTS BUE are WFL  -TD         MMT (Manual Muscle Testing)    General MMT Comments BUE are WFL  -TD         L-Dex Bioimpedence Screening    L-Dex Measurement Extremity RUE  -TD      L-Dex Patient Position Standing  -TD      L-Dex UE Dominate Side Left  -TD      L-Dex UE At Risk Side Right  -TD      L-Dex UE Pre Surgical Value Yes  -TD      L-Dex UE Score -0.9  -TD      L-Dex UE Baseline Score -0.9  -TD      L-Dex UE Value Change 0  -TD      L-Dex UE Comment baseline  -TD      $ L-Dex Charge yes  -TD         Lymphedema Life Impact Scale Totals    A.  Total Q1 - Q17 (Do not include Q18) 4  -TD      B.  Total number of questions answered (Q1-Q17) 17  -TD      C. Divide A by B 0.24  -TD      D. Multiple C by 25 6  -TD                User Key  (r) = Recorded By, (t) = Taken By, (c) = Cosigned By      Initials Name Provider Type    Gail Whitfield OT Occupational Therapist                            Therapy Education  Education Details: Pt was educated on lymphedema and the expectations of the lymphedema clinic. Pt. provided education on orthopedic and lymph fluid dynamic changes from lumpectomy and sentinel node removal surgery, post-surgical compression education and guidance, activity guidelines and weight bearing restrictions and education on a stretching HEP.  Pt. educated on the benefits of the use of post-surgical compression following her surgery.  Patient was educated to wear compression 24/7 until released by her surgeon or OT.  Size: 36/38 Patient educated on the benefit to a minimum of 2 post- surgical garments to have one to wash and one to wear to support hygiene and prevent infection.  Given: HEP, Symptoms/condition management  Program: New  How Provided: Verbal  Provided to: Patient  Level of Understanding: Teach back education performed         OT Goals        Row Name 06/14/23 1518          Time Calculation    OT Goal Re-Cert Due Date 07/14/23  -TD               User Key  (r) = Recorded By, (t) = Taken By, (c) = Cosigned By      Initials Name Provider Type    Gail Whitfield, OT Occupational Therapist                  1. Post Breast Surgery Care/at risk for Lymphedema  LTG 1: 90 days:  As an indicator of no exacerbation of lymphedema staging, the patient will present with an L-Dex score less than [10] points from preoperative baseline.   STATUS: New  STG 1a:   30 days: To prevent exacerbation of mixed edema to lymphedema, patient will utilize the 2 postsurgical compression garments daily.      STATUS: New  STG 1b: 30 days: Patient will be independent with self-manual lymphatic massage.    STATUS: New  STG 1c: 30 days:  Patient will be independent with identification of signs and symptoms of lymphedema exasperation per stoplight to recovery education handout.   STATUS: New  STG 1 d: 30 days: Patient will be independent with HEP to prevent advancement in lymphedema staging.   STATUS: New  TREATMENT:  Self Care/ADL retraining, Therapeutic Activity, Neuromuscular Re-education, Therapeutic Exercise, Bioimpedence Fluid Analysis, Post-Surgical compression garement [address] [address] Zip-ST-High/ Carla Camisole Kit 2860K, Orthotic Management and training,  and Manual Therapy.     OT Assessment/Plan       Row Name 06/14/23 1120          OT Assessment    Functional Limitations Limitations in functional capacity and performance  -TD     Impairments Impaired lymphatic circulation  -TD     Assessment Comments Arely is a 67 y.o. female with a diagnosis of invasive ductal carcinoma ER/FL + HER2- of the right breast. Pt will undergo a lumpectomy on 6/20/23.  Patient would benefit from continued skilled occupational therapy to prevent increased staging of lymphedema, decreased AROM, and increased pain.  -TD     OT Diagnosis at risk for lymphedema  -TD     OT Rehab Potential Good  -TD      Patient/caregiver participated in establishment of treatment plan and goals Yes  -TD     Patient would benefit from skilled therapy intervention Yes  -TD        OT Plan    OT Frequency --  see duraiton  -TD     Predicted Duration of Therapy Intervention (OT) Pt will be seen pre op, 3 weeks post op, 3 weeks post XRT, every 3 months years 1-3 and every 6 months years 4-5.  -TD     Planned CPT's? OT EVAL LOW COMPLEXITY: 35129;OT RE-EVAL: 55707;OT THER ACT EA 15 MIN: 37530US;OT SELF CARE/MGMT/TRAIN 15 MIN: 09878;OT MANUAL THERAPY EA 15 MIN: 58793;OT BIS XTRACELL FLUID ANALYSIS: 54566;OT CARE PLAN EA 15 MIN;OT ORTHOTIC MGMT/TRAIN EA 15 MIN: 56648;OT ORTHO/PROSTHET CHECKOUT EA 15 MIN: 16844  -TD     Planned Therapy Interventions (Optional Details) home exercise program;manual therapy techniques;stretching;strengthening;ROM (Range of Motion);prosthetic fitting/training;patient/family education;orthotic fitting/training  -TD     OT Plan Comments Initate POC  -TD               User Key  (r) = Recorded By, (t) = Taken By, (c) = Cosigned By      Initials Name Provider Type    TD Gail Pacheco OT Occupational Therapist                  OT eval complexity:   Examination:   Performance Deficits include:  dressing, bathing, grooming   # deficits affecting performance:  3  Decision Making:   Treatment Options Considered : adaption, remediation, prevention  # Treatment options considered : 3  Modification Made for: environment, task   Level of Task Modification: min/mod  Comorbidity Affect Performance: none  How is performance affected: n/a  Evaluation Level Determined:  low             Time Calculation:   Untimed Charges  OT Eval/Re-eval Minutes: 55  Total Minutes  Untimed Charges Total Minutes: 55   Total Minutes: 55     Therapy Charges for Today       Code Description Service Date Service Provider Modifiers Qty    26375193144 HC PT BIS XTRACELL FLUID ANALYSIS 6/14/2023 Gail Pacheco OT  1    [id number]  OT EVAL LOW  COMPLEXITY 4 6/14/2023 Gail Pacheco, OT GO 1                      Gail Pacheco OT  6/14/2023

## 2023-06-15 ENCOUNTER — LAB (OUTPATIENT)
Dept: LAB | Facility: HOSPITAL | Age: 68
End: 2023-06-15
Payer: MEDICARE

## 2023-06-15 DIAGNOSIS — C50.811 MALIGNANT NEOPLASM OF OVERLAPPING SITES OF RIGHT BREAST IN FEMALE, ESTROGEN RECEPTOR POSITIVE: ICD-10-CM

## 2023-06-15 DIAGNOSIS — Z72.0 TOBACCO ABUSE: ICD-10-CM

## 2023-06-15 DIAGNOSIS — Z17.0 MALIGNANT NEOPLASM OF OVERLAPPING SITES OF RIGHT BREAST IN FEMALE, ESTROGEN RECEPTOR POSITIVE: ICD-10-CM

## 2023-06-15 LAB — COTININE UR-MCNC: NEGATIVE NG/ML

## 2023-06-15 PROCEDURE — G0480 DRUG TEST DEF 1-7 CLASSES: HCPCS

## 2023-06-16 ENCOUNTER — PATIENT OUTREACH (OUTPATIENT)
Dept: ONCOLOGY | Facility: HOSPITAL | Age: 68
End: 2023-06-16
Payer: COMMERCIAL

## 2023-06-20 ENCOUNTER — HOSPITAL ENCOUNTER (OUTPATIENT)
Facility: HOSPITAL | Age: 68
Setting detail: HOSPITAL OUTPATIENT SURGERY
Discharge: HOME | End: 2023-06-20
Attending: SURGERY | Admitting: SURGERY
Payer: COMMERCIAL

## 2023-06-20 ENCOUNTER — APPOINTMENT (OUTPATIENT)
Dept: MAMMOGRAPHY | Facility: HOSPITAL | Age: 68
End: 2023-06-20
Payer: COMMERCIAL

## 2023-06-20 VITALS
HEIGHT: 62 IN | WEIGHT: 126.54 LBS | TEMPERATURE: 98.6 F | DIASTOLIC BLOOD PRESSURE: 78 MMHG | HEART RATE: 51 BPM | RESPIRATION RATE: 16 BRPM | BODY MASS INDEX: 23.29 KG/M2 | SYSTOLIC BLOOD PRESSURE: 130 MMHG | OXYGEN SATURATION: 97 %

## 2023-06-20 DIAGNOSIS — Z17.0 MALIGNANT NEOPLASM OF OVERLAPPING SITES OF RIGHT BREAST IN FEMALE, ESTROGEN RECEPTOR POSITIVE: ICD-10-CM

## 2023-06-20 DIAGNOSIS — C50.811 MALIGNANT NEOPLASM OF OVERLAPPING SITES OF RIGHT BREAST IN FEMALE, ESTROGEN RECEPTOR POSITIVE: ICD-10-CM

## 2023-06-20 DIAGNOSIS — C50.911 MALIGNANT NEOPLASM OF RIGHT FEMALE BREAST, UNSPECIFIED ESTROGEN RECEPTOR STATUS, UNSPECIFIED SITE OF BREAST: Primary | ICD-10-CM

## 2023-06-20 PROCEDURE — 19301 PARTIAL MASTECTOMY: CPT | Performed by: SURGERY

## 2023-06-20 PROCEDURE — 25010000002 GENTAMICIN PER 80 MG: Performed by: SURGERY

## 2023-06-20 PROCEDURE — 88307 TISSUE EXAM BY PATHOLOGIST: CPT | Performed by: SURGERY

## 2023-06-20 PROCEDURE — 88342 IMHCHEM/IMCYTCHM 1ST ANTB: CPT | Performed by: SURGERY

## 2023-06-20 PROCEDURE — 76098 X-RAY EXAM SURGICAL SPECIMEN: CPT

## 2023-06-20 PROCEDURE — 19318 BREAST REDUCTION: CPT | Performed by: SURGERY

## 2023-06-20 PROCEDURE — 88305 TISSUE EXAM BY PATHOLOGIST: CPT | Performed by: SURGERY

## 2023-06-20 PROCEDURE — C1889 IMPLANT/INSERT DEVICE, NOC: HCPCS | Performed by: SURGERY

## 2023-06-20 PROCEDURE — 25010000002 MIDAZOLAM PER 1MG: Performed by: ANESTHESIOLOGY

## 2023-06-20 PROCEDURE — S0260 H&P FOR SURGERY: HCPCS | Performed by: SURGERY

## 2023-06-20 PROCEDURE — 38525 BIOPSY/REMOVAL LYMPH NODES: CPT | Performed by: SURGERY

## 2023-06-20 DEVICE — LIGACLIP MCA MULTIPLE CLIP APPLIERS, 20 MEDIUM CLIPS
Type: IMPLANTABLE DEVICE | Site: BREAST | Status: FUNCTIONAL
Brand: LIGACLIP

## 2023-06-20 RX ORDER — OXYCODONE AND ACETAMINOPHEN 5; 325 MG/1; MG/1
1 TABLET ORAL EVERY 6 HOURS PRN
Qty: 30 TABLET | Refills: 0 | Status: SHIPPED | OUTPATIENT
Start: 2023-06-20 | End: 2023-06-29 | Stop reason: SDUPTHER

## 2023-06-20 RX ORDER — MIDAZOLAM HYDROCHLORIDE 2 MG/2ML
2 INJECTION, SOLUTION INTRAMUSCULAR; INTRAVENOUS ONCE
Status: COMPLETED | OUTPATIENT
Start: 2023-06-20 | End: 2023-06-20

## 2023-06-20 RX ORDER — CLINDAMYCIN PHOSPHATE 900 MG/50ML
900 INJECTION, SOLUTION INTRAVENOUS
Status: COMPLETED | OUTPATIENT
Start: 2023-06-20 | End: 2023-06-20

## 2023-06-20 RX ORDER — PROMETHAZINE HYDROCHLORIDE 25 MG/1
25 SUPPOSITORY RECTAL ONCE AS NEEDED
Status: DISCONTINUED | OUTPATIENT
Start: 2023-06-20 | End: 2023-06-20 | Stop reason: HOSPADM

## 2023-06-20 RX ORDER — SODIUM CHLORIDE, SODIUM LACTATE, POTASSIUM CHLORIDE, CALCIUM CHLORIDE 600; 310; 30; 20 MG/100ML; MG/100ML; MG/100ML; MG/100ML
9 INJECTION, SOLUTION INTRAVENOUS CONTINUOUS PRN
Status: DISCONTINUED | OUTPATIENT
Start: 2023-06-20 | End: 2023-06-20 | Stop reason: HOSPADM

## 2023-06-20 RX ORDER — ROSUVASTATIN CALCIUM 20 MG/1
20 TABLET, COATED ORAL DAILY
COMMUNITY

## 2023-06-20 RX ORDER — MEPERIDINE HYDROCHLORIDE 25 MG/ML
12.5 INJECTION INTRAMUSCULAR; INTRAVENOUS; SUBCUTANEOUS
Status: DISCONTINUED | OUTPATIENT
Start: 2023-06-20 | End: 2023-06-20 | Stop reason: HOSPADM

## 2023-06-20 RX ORDER — BUPIVACAINE HYDROCHLORIDE AND EPINEPHRINE 5; 5 MG/ML; UG/ML
INJECTION, SOLUTION EPIDURAL; INTRACAUDAL; PERINEURAL AS NEEDED
Status: DISCONTINUED | OUTPATIENT
Start: 2023-06-20 | End: 2023-06-20 | Stop reason: HOSPADM

## 2023-06-20 RX ORDER — OXYCODONE AND ACETAMINOPHEN 5; 325 MG/1; MG/1
1 TABLET ORAL EVERY 4 HOURS PRN
Status: DISCONTINUED | OUTPATIENT
Start: 2023-06-20 | End: 2023-06-20 | Stop reason: HOSPADM

## 2023-06-20 RX ORDER — ACETAMINOPHEN 500 MG
1000 TABLET ORAL ONCE
Status: COMPLETED | OUTPATIENT
Start: 2023-06-20 | End: 2023-06-20

## 2023-06-20 RX ORDER — CLINDAMYCIN HCL 150 MG
150 CAPSULE ORAL 3 TIMES DAILY
Qty: 30 CAPSULE | Refills: 0 | Status: SHIPPED | OUTPATIENT
Start: 2023-06-20 | End: 2023-07-18

## 2023-06-20 RX ORDER — PROMETHAZINE HYDROCHLORIDE 12.5 MG/1
25 TABLET ORAL ONCE AS NEEDED
Status: DISCONTINUED | OUTPATIENT
Start: 2023-06-20 | End: 2023-06-20 | Stop reason: HOSPADM

## 2023-06-20 RX ORDER — ONDANSETRON 4 MG/1
4 TABLET, FILM COATED ORAL DAILY PRN
Qty: 30 TABLET | Refills: 1 | Status: SHIPPED | OUTPATIENT
Start: 2023-06-20 | End: 2023-07-18

## 2023-06-20 RX ORDER — ONDANSETRON 2 MG/ML
4 INJECTION INTRAMUSCULAR; INTRAVENOUS ONCE AS NEEDED
Status: DISCONTINUED | OUTPATIENT
Start: 2023-06-20 | End: 2023-06-20 | Stop reason: HOSPADM

## 2023-06-20 RX ADMIN — SODIUM CHLORIDE, POTASSIUM CHLORIDE, SODIUM LACTATE AND CALCIUM CHLORIDE 9 ML/HR: 600; 310; 30; 20 INJECTION, SOLUTION INTRAVENOUS at 09:01

## 2023-06-20 RX ADMIN — ACETAMINOPHEN 1000 MG: 500 TABLET ORAL at 09:04

## 2023-06-20 RX ADMIN — MIDAZOLAM HYDROCHLORIDE 2 MG: 1 INJECTION, SOLUTION INTRAMUSCULAR; INTRAVENOUS at 13:13

## 2023-06-20 NOTE — H&P
History of Present Illness  Arely Huerta is a 67 y.o. female presents to [facility] to be seen for right breast cancer.   Her imaging and pathology are shown below:     Clinical Information     Malignant neoplasm of unspecified site of unspecified female breast   Final Diagnosis   Outside slides for review ([facility], [place], [place]):     Right breast,  8 o'clock position, 2.5 cm from nipple, ultrasound-guided core biopsy ([id number], collected 5/1/2023):  - Invasive carcinoma of no special type (ductal), with apocrine features  - Histologic grade ([place] Histologic Score):    - Glandular (Acinar)/Tubular Differentiation:  Score 3  - Nuclear Pleomorphism:  Score 3  - Mitotic Rate:  Score 1  - Overall Grade:  Grade 2               - Size of largest focus of invasion: 2.5 mm               - Breast biomarker testing:                            - Estrogen Receptor (ER):  Positive (greater than 90%, strong)                            - Progesterone Receptor (PgR):  Positive (80%, moderate to strong)                            - HER2 (by FISH): Negative (not amplified), per report  - Ki-67: 15%                        MAMMO DIAGNOSTIC DIGITAL TOMOSYNTHESIS RIGHT W CAD  Order: [id number]  Impression     Further ultrasonographic evaluation recommended, as described above.     ___________________________________     ASSESSMENT CATEGORY:   BIRADS Category 0:  Incomplete.  Need additional imaging evaluation.  A   letter regarding these results will be sent to the patient by the facility   within 30 days.     FOLLOW-UP RECOMMENDATION:   Ultrasound recommended. (I)     Approximately 10% of breast cancers are not detected by mammography.  A   normal mammogram should not delay biopsy of a clinically suspicious   abnormality.      MAMMOGRAPHY - UNILATERAL DIAGNOSTIC:  RIGHT BREAST     REASON FOR EXAM:   Female, 67 years old.  Other abnormal and inconclusive   findings on  diagnostic imaging of breast -- RIGHT BREAST FOCAL ASSYMETRY     PERTINENT HISTORY:  Non-contributory.     TECHNIQUE: Digital examination.  Mediolateral oblique (MLO) and   craniocaudad (CC) views of the breast were obtained. CAD:  CAD was not   performed on this study.     COMPARISON:   4/14/2023   ___________________________________     FINDINGS:   Breast Composition:  The breasts are heterogeneously dense, which may   obscure small masses.     Focal compression views confirm a 2 cm oval obscured high density mass in   the retroareolar right breast at mid depth and ultrasound is recommended   for further evaluation.     No other significant abnormalities are identified.   ___________________________________              Objective         Medical History        Past Medical History:   Diagnosis Date    Breast cancer              Surgical History   History reviewed. No pertinent surgical history.           Current Outpatient Medications:     amLODIPine (NORVASC) 5 MG tablet, Take 1 tablet by mouth Daily., Disp: , Rfl:     aspirin  MG tablet, , Disp: , Rfl:     atenolol (TENORMIN) 100 MG tablet, atenolol 100 mg oral tablet take 1 tablet (100 mg) by oral route once daily   Active, Disp: , Rfl:     atropine 1 % ophthalmic solution, , Disp: , Rfl:     brimonidine (ALPHAGAN) 0.2 % ophthalmic solution, instill 1 drop into right eye twice daily (Patient not taking: Reported on 5/18/2023), Disp: , Rfl:     Cetirizine HCl 10 MG capsule, Take  by mouth. (Patient not taking: Reported on 5/18/2023), Disp: , Rfl:     cholecalciferol (VITAMIN D3) 25 MCG (1000 UT) tablet, Take 1 tablet by mouth Daily., Disp: , Rfl:     Cholecalciferol 50 MCG (2000 UT) capsule, Vitamin D3 2,000 unit oral capsule take 1 capsule by oral route daily   Active (Patient not taking: Reported on 5/18/2023), Disp: , Rfl:     Cobalamin Combinations (B12 Folate) 800-800 MCG capsule, Take  by mouth., Disp: , Rfl:     cyanocobalamin (VITAMIN B-12) 500 MCG  tablet, Take 1 tablet by mouth Daily., Disp: , Rfl:     difluprednate (DUREZOL) 0.05 % ophthalmic emulsion, Apply 1 drop to eye(s) as directed by provider., Disp: , Rfl:     diphenhydrAMINE (SOMINEX) 25 MG tablet, , Disp: , Rfl:     dorzolamide (TRUSOPT) 2 % ophthalmic solution, , Disp: , Rfl:     escitalopram (LEXAPRO) 20 MG tablet, Take 4.5 tablets by mouth Daily., Disp: , Rfl:     Estradiol 10 % cream, , Disp: , Rfl:     fluticasone (FLONASE) 50 MCG/ACT nasal spray, 1 spray into the nostril(s) as directed by provider Daily. (Patient not taking: Reported on 5/18/2023), Disp: , Rfl:     folic acid (FOLVITE) 400 MCG tablet, Take 1 tablet by mouth Daily. (Patient not taking: Reported on 5/18/2023), Disp: , Rfl:     folic acid (FOLVITE) 800 MCG tablet, folic acid 800 mcg oral tablet take 1 tablet (0.8 mg) by oral route once daily   Active, Disp: , Rfl:     ketorolac (ACULAR) 0.5 % ophthalmic solution, INSTILL 1 DROP INTO RIGHT EYE TWICE DAILY STARTING 2 DAYS BEFORE SURGERY AND INCREASE TO FOUR TIMES DAILY AFTER SURGERY AND TAPER, Disp: , Rfl:     levothyroxine (SYNTHROID, LEVOTHROID) 100 MCG tablet, Take 1 tablet by mouth Daily. (Patient not taking: Reported on 5/18/2023), Disp: , Rfl:     levothyroxine (SYNTHROID, LEVOTHROID) 125 MCG tablet, Take 1 tablet by mouth Every Morning Before Breakfast., Disp: , Rfl:     loratadine (CLARITIN) 10 MG tablet, Take 1 tablet by mouth Daily., Disp: , Rfl:     losartan (COZAAR) 100 MG tablet, Take 1 tablet by mouth Daily., Disp: , Rfl:     moxifloxacin (VIGAMOX) 0.5 % ophthalmic solution, INSTILL 1 DROP INTO RIGHT EYE 4 TIMES DAILY 2 DAYS BEFORE SURGERY, THEN EVERY HOUR WHILE AWAKE DAY OF SURGERY, THEN 4 TIMES DAILY 4 DAYS AFTER SURGERY, THEN STOP (Patient not taking: Reported on 5/18/2023), Disp: , Rfl:     omeprazole (priLOSEC) 40 MG capsule, Take 1 capsule by mouth Daily., Disp: , Rfl:     POTASSIUM PO, , Disp: , Rfl:     prednisoLONE acetate (PRED FORTE) 1 % ophthalmic  "suspension, INSTILL 1 DROP INTO RIGHT EYE 4 TIMES DAILY AS DIRECTED, Disp: , Rfl:     predniSONE (DELTASONE) 5 MG tablet, TAKE 4 TABLETS BY MOUTH ONCE DAILY FOR 5 DAYS THEN 3 ONCE DAILY FOR 5 DAYS THEN 2 ONCE DAILY FOR 5 DAYS THEN 1 ONCE DAILY FOR 5 DAYS (Patient not taking: Reported on 5/18/2023), Disp: , Rfl:     rosuvastatin (CRESTOR) 20 MG tablet, rosuvastatin 20 mg oral tablet take 1 tablet (20 mg) by oral route once daily   Suspended, Disp: , Rfl:     verapamil (CALAN) 120 MG tablet, verapamil 120 mg oral tablet take 1 tablet by oral route daily   Active (Patient not taking: Reported on 5/18/2023), Disp: , Rfl:     Vitamin A 2400 MCG (8000 UT) capsule, vitamin A 8,000 unit oral capsule take 1 capsule (8,000 unit) by oral route once daily   Active (Patient not taking: Reported on 5/18/2023), Disp: , Rfl:     sodium chloride (ARAMIS 128) 5 % ophthalmic solution, 1 drop As Needed., Disp: , Rfl:            Allergies   Allergen Reactions    Penicillins Anaphylaxis    Atorvastatin Unknown - Low Severity       Myalgia    Doxycycline Unknown - Low Severity       Abdominal pain    Lisinopril Unknown - Low Severity       cough    Simvastatin Unknown - Low Severity       Leg cramps    Sulfa Antibiotics Dizziness    Losartan Rash         History reviewed. No pertinent family history.     Social History   Social History           Socioeconomic History    Marital status:    Tobacco Use    Smoking status: Never    Smokeless tobacco: Never   Vaping Use    Vaping Use: Never used            Vital Signs:   Resp 16   Ht 152.4 cm (60\")   Wt 56.2 kg (124 lb)   BMI 24.22 kg/m²    Review of Systems     Physical Exam  Vitals and nursing note reviewed.   Constitutional:       Appearance: Normal appearance.   HENT:      Head: Normocephalic and atraumatic.   Eyes:      Extraocular Movements: Extraocular movements intact.      Pupils: Pupils are equal, round, and reactive to light.   Cardiovascular:      Pulses: Normal pulses. "   Pulmonary:      Effort: Pulmonary effort is normal. No accessory muscle usage or respiratory distress.   Chest:   Breasts:     Right: Mass present. No inverted nipple, nipple discharge or skin change.      Left: Normal. No inverted nipple, nipple discharge or skin change.      Comments: Palpable mass in inferior right breast, no LAD  Abdominal:      General: Abdomen is flat.      Palpations: Abdomen is soft.      Tenderness: There is no abdominal tenderness. There is no guarding.   Musculoskeletal:         General: No swelling, tenderness or deformity.      Cervical back: Neck supple.   Lymphadenopathy:      Upper Body:      Right upper body: No supraclavicular or axillary adenopathy.      Left upper body: No supraclavicular or axillary adenopathy.   Skin:     General: Skin is warm and dry.   Neurological:      General: No focal deficit present.      Mental Status: She is alert and oriented to person, place, and time.   Psychiatric:         Mood and Affect: Mood normal.         Thought Content: Thought content normal.                  Result Review    :                     Assessment      Assessment and Plan    Diagnoses and all orders for this visit:     1. Malignant neoplasm of overlapping sites of right breast in female, estrogen receptor positive (Primary)  -     Ambulatory Referral to Plastic Surgery  -     NM sentinel node injection only; Future     Will plan for needle localized lumpectomy with SLN bx with combination with plastics reduction bilaterally  Will need genetics ( brother with melanoma , sister with breast cancer in 30s, aunt with breast cancer, father with prostate cancer that was aggressive and found in bone at time of diagnosis in early 70s)        Follow Up   Return for Next scheduled followup after surgery.  Patient was given instructions and counseling regarding her condition or for health maintenance advice. Please see specific information pulled into the AVS if appropriate.

## 2023-06-20 NOTE — DISCHARGE INSTRUCTIONS
DISCHARGE INSTRUCTIONS  [] Breast Biopsy  [x] Lumpectomy  [] Lymph Node Dissection           For your surgery you had:  General anesthesia (you may have a sore throat for the first 24 hours)  You may experience dizziness, drowsiness, or light-headedness for several hours following surgery/procedure.  Do not stay alone today or tonight.  Limit your activity for 24 hours.  You should not drive, operate machinery, drink alcohol, or sign legally binding documents for 24 hours or while you are taking pain medication.  Resume your diet slowly.  Follow whatever special dietary instructions you may have been given by your doctor.  Last dose of pain medication was given at:   .  NOTIFY YOUR DOCTOR IF YOU EXPERIENCE ANY OF THE FOLLOWING:  Temperature greater than 101 degrees Fahrenheit  Shaking Chills  Redness or excessive drainage from incision  Nausea, vomiting and/or pain that is not controlled by prescribed medications  Increase in bleeding or bleeding that is excessive  Unable to urinate in 6 hours after surgery  If unable to reach your doctor, please go to the closest Emergency Room  You may shower Thursday. No tub baths, swimming pools or hot tubs for 2 weeks.  Wear a bra for support 24/7 after first shower.  Do not do any heavy lifting.  After your surgery you may notice some bruising.  This is normal.  Medications per physician instructions as indicated on Discharge Medication Information Sheet.  You should see   for follow-up care   on   .  Phone number:       SPECIAL INSTRUCTIONS:

## 2023-06-20 NOTE — OP NOTE
Plastic Surgery Op Note  BILATERAL BREAST RECONSTRUCTION  WITH RIGHT ONCOPLASTIC CLOSURE AND MASTOPEXY, LEFT REDUCTION MASTOPEXY  Arely Huerta  6/20/2023    Pre-op Diagnosis:   Malignant neoplasm of right female breast, unspecified estrogen receptor status, unspecified site of breast [C50.911]    Post-Op Diagnosis Codes:     * Malignant neoplasm of right female breast, unspecified estrogen receptor status, unspecified site of breast [C50.911]  Disproportion between native and reconstructed breast    Anesthesia: General    Staff:   Circulator: Asim Shepherd RN  Scrub Person: Daisy Patel; Gail Galvan; David Naranjo  Assistant: Yuly Fabian RN CSA; Gagan Leo    First Assistant: ENMANUEL Cortez who was instrumental in helping with hemostasis, visualization and retraction structures as well as surgical closure.  Her skilled assistance was necessary for the success of this case.      Estimated Blood Loss: 50 mL    Specimens:   Order Name Source Comment Collection Info Order Time   TISSUE PATHOLOGY EXAM Breast, Right  Collected By: Laura Goff MD 6/20/2023  2:17 PM     Release to patient   Routine Release        TISSUE PATHOLOGY EXAM Breast, Left  Collected By: Caren Martins MD 6/20/2023  2:19 PM     Release to patient   Routine Release              Drains: * No LDAs found *    Findings:   Breast weight:  R: 106g  L:113g    Complications: none       Date: 6/20/2023  Time: 15:49 EDT  After risks and benefits were discussed with patient and inform consent was signed, patient was taken to the OR and positioned supine. Then patient was anesthetized using general anesthesia and the neck, chest and upper abdomen were prepped in a sterile fashion way. A time out confirming the procedure to be performed and patient's name was performed. The whole team was introduced by name.      Pre op markings were done and consisted of a 21 cm distance between the sternal notch to nipple, 3.6 cm  final nipple diameter and 5.5 cm vertical line.     I  started the procedure performing a christina areolar incision on the right areola and de epithelizing the skin until the outside markings. Then, Dr Goff proceeded with the partial mastectomy. Meanwhile, I proceeded with the christina areolar incision on the left breast and de epithelization of the periareolar skin until the pre op markings. Then I resected breast tissue on the left aspect of the breast in a symmetric way as Dr Goff was performing on the other breast.   This breast tissue was resected compared and sent for pathology. Hemostasis was performed. The excess of skin was resected from the inferior part of the breast and sent to pathology. The areola was then repositioned and the incisions were closed with  insorb followed by  3-0 vicryl. Once finished I proceeded to the contralateral side. The partial mastectomy was already done and a mirror procedure was performed.  At the end of the procedure, all instruments were checked. There were no complications and I was present for the entire procedure          Caren Martins MD  06/20/23  15:49 EDT

## 2023-06-20 NOTE — INTERVAL H&P NOTE
H&P reviewed. The patient was examined and there are no changes to the H&P.    Patient is not tachycardic  Respirations are non elaborated  Vitals:    06/20/23 0908   BP: 157/90   Pulse: (!) 48   Resp: 20   Temp: 98.7 °F (37.1 °C)   SpO2: 98%     Risks and benefits reminded to patient who agrees to proceed

## 2023-06-20 NOTE — OP NOTE
BREAST BIOPSY WITH NEEDLE LOCALIZATION  Procedure Report    Patient Name:  Arely Huerta  YOB: 1955    Date of Surgery:  6/20/2023     Indications:  Breast cancer    Pre-op Diagnosis:   Malignant neoplasm of right female breast, unspecified estrogen receptor status, unspecified site of breast [C50.911]       Post-Op Diagnosis Codes:     * Malignant neoplasm of right female breast, unspecified estrogen receptor status, unspecified site of breast [C50.911]    Procedure/CPT® Codes:      Procedure(s):  RIGHT BREAST LUMPECTOMY WITH NEEDLE LOCALIZATION  Bilateral breast reconstruction, left breast reduction, right breast oncoplastic closure with mastopexy    Staff:  Surgeon(s):  Caren Martins MD Brunkhorst, Karen J, MD    Assistant: Yuly Fabian RN CSA; Gagan Leo    Anesthesia: General    Estimated Blood Loss: minimal    Implants:    Implant Name Type Inv. Item Serial No.  Lot No. LRB No. Used Action   CLIPAPPLR M/ ENDO LIGACLIP 20CLP 11IN MD - [id number] Implant CLIPAPPLR M/ ENDO LIGACLIP 20CLP 11IN MD  [facility] END[facility] SURGERY  DIV [employer] J [employer] J [id number] Right 1 Implanted       Specimen:          Specimens       ID Source Type Tests Collected By Collected At Frozen?    A Breast, Right Tissue TISSUE PATHOLOGY EXAM   Laura Goff MD 6/20/23 1421     Description: RIGHT BREAST NEEDLE LOCALIZED MASS SHORT STITCH SUPERIOR, LONG STITCH LATERAL    Comment: FRESH FOR PERMANENT  TO MAMMOGRAPHY THEN TO PATHOLOGY    OSEC 2 EXT: 5375    This specimen was not marked as sent.    B Breast, Right Tissue TISSUE PATHOLOGY EXAM   Laura Goff MD 6/20/23 1416     Description: Right breast tissue, new posterior margin, clips on new margin    This specimen was not marked as sent.    C Breast, Right Tissue TISSUE PATHOLOGY EXAM   Laura Goff MD 6/20/23 1417     Description: Right breast tissue, new superior margin    This specimen was not marked as sent.    D Breast, Left  Tissue TISSUE PATHOLOGY EXAM   Caren Martins MD 6/20/23 1418     Description: Left breast tissue    This specimen was not marked as sent.    E [place] Lymph Node Tissue TISSUE PATHOLOGY EXAM   Laura Goff MD 6/20/23 1427 Yes    Description: Right breast sentinel node, Count 601    Comment: Call ext [phone]    This specimen was not marked as sent.    F [place] Lymph Node Tissue TISSUE PATHOLOGY EXAM   Laura Goff MD 6/20/23 1428 Yes    Description: Right breast sentinel node, Count 160    This specimen was not marked as sent.                Findings: none    Complications: none    Description of Procedure:   The risks and benefits and treatment options were discussed with her. After informed consent was obtained, she was taken to the OR and placed supine on the table. Earlier this AM she had a wire placed and she had also been injected for a sentinel node. We began with axillary portion of the procedure. A small incision was made in the axilla and this probe was used to identify the hottest nodes. After the clavipectoral fascia had been incised, one node had a count of 160 and the second node had a count of 601.  Both were palpable.  The remainder of the bed had a count of less than 10% of the highest number. The wound was copiously irrigated with electrocautery and clips were used to achieve hemostasis. The deeper layers were closed with 2-0 Vicryl and the skin was closed with runnign sutures.    We then moved to the breast portion of the procedure. An incision was made around the wire and then skin flaps were raised anteriorly and the tissue was excised circumferentially around the area. The specimen was removed, marked for orientation, and handed off as a specimen. The wound bed was irrigated. Hemostasis was achieved with electrocautery and clips. Extra margins were taken as above.  The deeper layers were closed with 2-0 Vicryl, the skin was closed with a 4.0 subcuticular stitch. The  specimen had been sent to radiology for evaluation and they did call back and confirm that we had the the wire, the clip, the abnormal appearing tissue and a good margin of normal tissue around it  Assistant: Yuly Fabian RN CSA; Gagan Leo  was responsible for performing the following activities: Retraction, Suction, and Irrigation and their skilled assistance was necessary for the success of this case.            Laura Goff MD     Date: 6/20/2023  Time: 14:35 EDT

## 2023-06-23 LAB
CYTO UR: NORMAL
LAB AP CASE REPORT: NORMAL
LAB AP CLINICAL INFORMATION: NORMAL
PATH REPORT.FINAL DX SPEC: NORMAL
PATH REPORT.GROSS SPEC: NORMAL

## 2023-06-27 LAB
CYTO UR: NORMAL
LAB AP CASE REPORT: NORMAL
LAB AP CLINICAL INFORMATION: NORMAL
LAB AP SPECIAL STAINS: NORMAL
LAB AP SYNOPTIC CHECKLIST: NORMAL
PATH REPORT.FINAL DX SPEC: NORMAL
PATH REPORT.GROSS SPEC: NORMAL

## 2023-06-29 DIAGNOSIS — G89.18 POST-OP PAIN: Primary | ICD-10-CM

## 2023-06-29 RX ORDER — OXYCODONE HYDROCHLORIDE AND ACETAMINOPHEN 5; 325 MG/1; MG/1
1 TABLET ORAL EVERY 6 HOURS PRN
Qty: 12 TABLET | Refills: 0 | Status: CANCELLED | OUTPATIENT
Start: 2023-06-29

## 2023-07-18 PROBLEM — N32.9 BLADDER DISORDER: Status: ACTIVE | Noted: 2023-07-18

## 2023-07-18 PROBLEM — M45.9 ANKYLOSING SPONDYLITIS: Status: ACTIVE | Noted: 2022-09-21

## 2023-07-18 PROBLEM — I73.9 PVD (PERIPHERAL VASCULAR DISEASE): Status: ACTIVE | Noted: 2022-08-09

## 2023-07-18 PROBLEM — J44.9 COPD (CHRONIC OBSTRUCTIVE PULMONARY DISEASE): Status: ACTIVE | Noted: 2022-08-09

## 2023-07-18 PROBLEM — D68.59 PRIMARY HYPERCOAGULABLE STATE: Status: ACTIVE | Noted: 2023-07-18

## 2023-07-18 PROBLEM — R06.02 SHORTNESS OF BREATH: Status: ACTIVE | Noted: 2023-07-18

## 2023-07-18 PROBLEM — J30.9 ALLERGIC RHINITIS: Status: ACTIVE | Noted: 2023-07-18

## 2023-07-18 PROBLEM — M19.90 ARTHRITIS: Status: ACTIVE | Noted: 2023-07-18

## 2023-07-18 PROBLEM — I34.1 MITRAL VALVE PROLAPSE: Status: ACTIVE | Noted: 2023-07-18

## 2023-07-18 PROBLEM — E78.00 HIGH CHOLESTEROL: Status: ACTIVE | Noted: 2023-07-18

## 2023-07-18 PROBLEM — J45.909 ASTHMA: Status: ACTIVE | Noted: 2022-08-09

## 2023-07-18 PROBLEM — E03.9 HYPOTHYROIDISM: Status: ACTIVE | Noted: 2023-07-18

## 2023-07-18 PROBLEM — N63.0 BREAST LUMP: Status: ACTIVE | Noted: 2023-07-18

## 2023-07-18 PROBLEM — D72.819 LEUKOPENIA: Status: ACTIVE | Noted: 2022-05-19

## 2023-07-18 PROBLEM — I10 HIGH BLOOD PRESSURE: Status: ACTIVE | Noted: 2023-07-18

## 2023-07-18 PROBLEM — K62.5 RECTAL BLEEDING: Status: ACTIVE | Noted: 2023-07-18

## 2023-07-18 PROBLEM — G98.8 NEUROLOGIC DISORDER: Status: ACTIVE | Noted: 2023-07-18

## 2023-07-18 PROBLEM — I10 BENIGN ESSENTIAL HTN: Status: ACTIVE | Noted: 2022-08-09

## 2023-07-18 PROBLEM — C80.1 CANCER: Status: ACTIVE | Noted: 2023-07-18

## 2023-07-25 ENCOUNTER — TELEPHONE (OUTPATIENT)
Dept: SURGERY | Facility: CLINIC | Age: 68
End: 2023-07-25
Payer: COMMERCIAL

## 2023-07-25 NOTE — TELEPHONE ENCOUNTER
"Pt had right breast lumpectomy and reconstruction on 6/20. She has noticed that she has a generalized redness under both breasts that is new. There is no heat or drainage and she states that it does not look \"rashy\".  reports that it looks inflamed. Still has glue at her incisions. She has not worn a bra for the past few days to see if there was an improvement but it has not changed. Reports that she was told to call for any redness.   "

## 2023-07-26 NOTE — TELEPHONE ENCOUNTER
Pt called back. She does not have fever or chills and stated that the redness has gone down. She has an appointment with oncology on 8/1/23 and will ask them. But Pt is aware that if the redness comes back or starts having fever or chills to call me back to get an appointment. Pt V/U.

## 2023-07-28 DIAGNOSIS — C50.811 MALIGNANT NEOPLASM OF OVERLAPPING SITES OF RIGHT BREAST IN FEMALE, ESTROGEN RECEPTOR POSITIVE: ICD-10-CM

## 2023-07-28 DIAGNOSIS — Z17.0 MALIGNANT NEOPLASM OF OVERLAPPING SITES OF RIGHT BREAST IN FEMALE, ESTROGEN RECEPTOR POSITIVE: ICD-10-CM

## 2023-07-31 RX ORDER — ANASTROZOLE 1 MG/1
1 TABLET ORAL DAILY
Qty: 30 TABLET | Refills: 1 | Status: SHIPPED | OUTPATIENT
Start: 2023-07-31

## 2023-08-01 ENCOUNTER — HOSPITAL ENCOUNTER (OUTPATIENT)
Dept: RADIATION ONCOLOGY | Facility: HOSPITAL | Age: 68
Setting detail: RADIATION/ONCOLOGY SERIES
End: 2023-08-01
Payer: COMMERCIAL

## 2023-08-01 ENCOUNTER — TELEPHONE (OUTPATIENT)
Dept: PLASTIC SURGERY | Facility: CLINIC | Age: 68
End: 2023-08-01
Payer: COMMERCIAL

## 2023-08-03 ENCOUNTER — TELEPHONE (OUTPATIENT)
Dept: PLASTIC SURGERY | Facility: CLINIC | Age: 68
End: 2023-08-03
Payer: COMMERCIAL

## 2023-08-04 NOTE — PROGRESS NOTES
"Post-op Follow-up (7 week post op)            History of Present Illness  Arely Huerta is a 67 y.o. female who presents to [facility] as a post operative follow up for  Bilateral breast reconstruction, left breast reduction, right breast oncoplastic closure with mastopexy 6/20/23.    She is 7 week post op. Pt notes some redness under bilateral breast. She will be starting radiation soon.       Subjective      Penicillins, Atorvastatin, Doxycycline, Lisinopril, Simvastatin, and Sulfa antibiotics  Allergies Reconciled.    Review of Systems   All review of system has been reviewed and it  is negative except the ones note above.     Objective     /81 (BP Location: Left arm, Patient Position: Sitting, Cuff Size: Adult)   Temp 97.6 øF (36.4 øC) (Temporal)   Ht 156.2 cm (61.5\")   Wt 58.6 kg (129 lb 3.2 oz)   BMI 24.02 kg/mý     Body mass index is 24.02 kg/mý.    Physical Exam   Cardiovascular: Normal rate.     Pulmonary/Chest  Effort normal.     Breast: Incision C/D/I with good symmetry, no signs of infection                                                          Result Review :              Assessment and Plan      Diagnoses and all orders for this visit:    1. Malignant neoplasm of overlapping sites of right breast in female, estrogen receptor positive (Primary)    2. Disproportion between native breast and reconstructed breast          Additional Order(s):       Plan: RTO for 6m post op follow up    Scribed by Charlotte Gomez, acting as a scribe for Caren Martins MD, 08/10/23 10:17 EDT.  Caren Martins MD's signature on the note affirms that the note adequately documents the care provided.          Patient was given instructions and counseling regarding her condition. Please see specific information pulled into the AVS if appropriate.     Caren Martins MD  08/10/2023       "

## 2023-08-07 ENCOUNTER — HOSPITAL ENCOUNTER (OUTPATIENT)
Dept: OCCUPATIONAL THERAPY | Facility: HOSPITAL | Age: 68
Setting detail: THERAPIES SERIES
Discharge: HOME | End: 2023-08-07
Payer: COMMERCIAL

## 2023-08-07 DIAGNOSIS — Z91.89 AT RISK FOR LYMPHEDEMA: Primary | ICD-10-CM

## 2023-08-07 DIAGNOSIS — Z17.0 MALIGNANT NEOPLASM OF UPPER-OUTER QUADRANT OF RIGHT BREAST IN FEMALE, ESTROGEN RECEPTOR POSITIVE: ICD-10-CM

## 2023-08-07 DIAGNOSIS — C50.411 MALIGNANT NEOPLASM OF UPPER-OUTER QUADRANT OF RIGHT BREAST IN FEMALE, ESTROGEN RECEPTOR POSITIVE: ICD-10-CM

## 2023-08-07 PROCEDURE — 97535 SELF CARE MNGMENT TRAINING: CPT | Performed by: OCCUPATIONAL THERAPIST

## 2023-08-07 PROCEDURE — 93702 BIS XTRACELL FLUID ANALYSIS: CPT | Performed by: OCCUPATIONAL THERAPIST

## 2023-08-07 NOTE — THERAPY RE-EVALUATION
Outpatient Occupational Therapy Lymphedema Re-Evaluation   Jesu     Patient Name: Arely Huerta  : 1955  MRN: [id number]  Today's Date: 2023      Visit Date: 2023    Patient Active Problem List   Diagnosis    Malignant neoplasm of overlapping sites of right breast in female, estrogen receptor positive    Allergic rhinitis    Ankylosing spondylitis    Arthritis    Asthma    Benign essential HTN    Bladder disorder    Breast lump    Cancer    COPD (chronic obstructive pulmonary disease)    High blood pressure    PVD (peripheral vascular disease)    High cholesterol    Hypothyroidism    Leukopenia    Mitral valve prolapse    Neurologic disorder    Primary hypercoagulable state    Rectal bleeding    Shortness of breath        Past Medical History:   Diagnosis Date    Acid reflux     Allergies     Ankylosing spondylitis     Breast cancer     Hypercholesteremia     Hypertension     Skin cancer     Thyroid condition         Past Surgical History:   Procedure Laterality Date    APPENDECTOMY      BREAST BIOPSY Right 2023    Procedure: RIGHT BREAST LUMPECTOMY WITH NEEDLE LOCALIZATION;  Surgeon: Laura Goff MD;  Location: [facility];  Service: General;  Laterality: Right;    BREAST RECONSTRUCTION Bilateral 2023    Procedure: Bilateral breast reconstruction, left breast reduction, right breast oncoplastic closure with mastopexy;  Surgeon: Caren Martins MD;  Location: [facility] OR [facility];  Service: Plastics;  Laterality: Bilateral;    CORNEAL TRANSPLANT Right     FOOT SURGERY      SINUS SURGERY      x6    TUBAL ABDOMINAL LIGATION           Visit Dx:     ICD-10-CM ICD-9-CM   1. At risk for lymphedema  Z91.89 V49.89   2. Malignant neoplasm of upper-outer quadrant of right breast in female, estrogen receptor positive  C50.411 174.4    Z17.0 V86.0        Patient History       Row Name 23 1100             History    Chief Complaint --  Lymphedema Surveillance Program  -TD       Brief Description of Current Complaint Arely is a 67 y.o. female with a diagnosis of invasive ductal carcinoma ER/RI + HER2- of the right breast.  Pt will underwent a lumpectomy on 6/20/23.  -TD         Fall Risk Assessment    Any falls in the past year: No  -TD      Does patient have a fear of falling No  -TD         Services    Are you currently receiving Home Health services No  -TD      Do you plan to receive Home Health services in the near future No  -TD         Daily Activities    Primary Language English  -TD      Are you able to read Yes  -TD      Are you able to write Yes  -TD      How does patient learn best? Listening;Reading;Demonstration;Pictures/Video  -TD         Safety    Are you being hurt, hit, or frightened by anyone at home or in your life? No  -TD      Are you being neglected by a caregiver No  -TD      Have you had any of the following issues with N/A  -TD                User Key  (r) = Recorded By, (t) = Taken By, (c) = Cosigned By      Initials Name Provider Type    TD Gail Pacheco OT Occupational Therapist                     Lymphedema       Row Name 08/07/23 1100             Subjective Pain    Able to rate subjective pain? yes  -TD      Pre-Treatment Pain Level 0  -TD      Post-Treatment Pain Level 0  -TD         Subjective Comments    Subjective Comments Pt is to start radiation soon.  -TD         Lymphedema Assessment    Lymphedema Classification RUE:;at risk/stage 0  -TD      Lymphedema Cancer Related Sx right;lumpectomy;simple mastectomy  -TD      Lymphedema Surgery Comments 6/20/2023  -TD      Lymph Nodes Removed # 2  -TD      Positive Lymph Nodes # 0  -TD      Chemo Received no  -TD      Radiation Therapy Received yes  -TD      Radiation Treatments #/Timeframe 25  -TD         LLIS - Physical Concerns    The amount of pain associated with my lymphedema is: 0  -TD      The amount of limb heaviness associated with my lymphedema is: 0  -TD      The amount of skin tightness associated  "with my lymphedema is: 0  -TD      The size of my swollen limb(s) seems: 0  -TD      Lymphedema affects the movement of my swollen limb(s): 0  -TD      The strength in my swollen limb(s) is: 0  -TD         LLIS - Psychosocial Concerns    Lymphedema affects my body image (i.e., \"how I think I look\"). 0  -TD      Lymphedema affects my socializing with others. 0  -TD      Lymphedema affects my intimate relations with spouse or partner (rate 0 if not applicable 0  -TD      Lymphedema \"gets me down\" (i.e., depression, frustration, or anger) 0  -TD      I must rely on others for help due to my lymphedema. 0  -TD      I know what to do to manage my lymphedema 3  -TD         LLIS - Functional Concerns    Lymphedema affects my ability to perform self-care activities (i.e. eating, dressing, hygiene) 0  -TD      Lymphedema affects my ability to perform routine home or work-related activities. 0  -TD      Lymphedema affects my performance of preferred leisure activities. 0  -TD      Lymphedema affects proper fit of clothing/shoes 0  -TD      Lymphedema affects my sleep 0  -TD         Posture/Observations    Posture- WNL Posture is WNL  -TD         General ROM    GENERAL ROM COMMENTS BUE are WFL  -TD         MMT (Manual Muscle Testing)    General MMT Comments BUE are WFL  -TD         L-Dex Bioimpedence Screening    L-Dex Measurement Extremity RUE  -TD      L-Dex Patient Position Standing  -TD      L-Dex UE Dominate Side Left  -TD      L-Dex UE At Risk Side Right  -TD      L-Dex UE Pre Surgical Value Yes  -TD      L-Dex UE Score -0.4  -TD      L-Dex UE Baseline Score -0.9  -TD      L-Dex UE Value Change 0.5  -TD      $ L-Dex Charge yes  -TD         Lymphedema Life Impact Scale Totals    A.  Total Q1 - Q17 (Do not include Q18) 3  -TD      B.  Total number of questions answered (Q1-Q17) 17  -TD      C. Divide A by B 0.18  -TD      D. Multiple C by 25 4.5  -TD                User Key  (r) = Recorded By, (t) = Taken By, (c) = " Cosigned By      Initials Name Provider Type    Gail Whitfield OT Occupational Therapist                            Therapy Education  Education Details: Therapist reviewed MLD and spot light prevention program.  Given: HEP, Symptoms/condition management, Edema management  Program: New, Reinforced  How Provided: Verbal  Provided to: Patient  Level of Understanding: Teach back education performed, Verbalized  84978 - OT Self Care/Mgmt Minutes: 15         OT Goals       Row Name 08/07/23 1402          Time Calculation    OT Goal Re-Cert Due Date 09/06/23  -TD               User Key  (r) = Recorded By, (t) = Taken By, (c) = Cosigned By      Initials Name Provider Type    Gail Whitfield, SHERRELL Occupational Therapist                1. Post Breast Surgery Care/at risk for Lymphedema  LTG 1: 90 days:  As an indicator of no exacerbation of lymphedema staging, the patient will present with an L-Dex score less than [10] points from preoperative baseline.              STATUS: on going  STG 1a:   30 days: To prevent exacerbation of mixed edema to lymphedema, patient will utilize the 2 postsurgical compression garments daily.                 STATUS: on going  STG 1b: 30 days: Patient will be independent with self-manual lymphatic massage.               STATUS: on going  STG 1c: 30 days:  Patient will be independent with identification of signs and symptoms of lymphedema exasperation per stoplight to recovery education handout.              STATUS: on going  STG 1 d: 30 days: Patient will be independent with HEP to prevent advancement in lymphedema staging.              STATUS: on going  TREATMENT:  Self Care/ADL retraining, Therapeutic Activity, Neuromuscular Re-education, Therapeutic Exercise, Bioimpedence Fluid Analysis, Post-Surgical compression garement [address] [facility] [place] [employer] [facility] 2860K, Orthotic Management and training,  and Manual Therapy.     OT Assessment/Plan       Row Name 08/07/23 4631          OT  Assessment    Functional Limitations Limitations in functional capacity and performance  -TD     Impairments Impaired lymphatic circulation  -TD     Assessment Comments Arely is a 67 y.o. female with a diagnosis of invasive ductal carcinoma ER/DE + HER2- of the right breast. Pt will underwent a lumpectomy on 6/20/23. Pt l-dex score is back into normal visits at this time. Patient would benefit from continued skilled occupational therapy to prevent increased staging of lymphedema, decreased AROM, and increased pain.  -TD     OT Diagnosis at risk for lymphedema  -TD     OT Rehab Potential Good  -TD     Patient/caregiver participated in establishment of treatment plan and goals Yes  -TD     Patient would benefit from skilled therapy intervention Yes  -TD        OT Plan    OT Frequency --  see duration  -TD     Predicted Duration of Therapy Intervention (OT) Pt will be seen every 3 months years 1-3 and every 6 months years 4-5.  -TD     Planned CPT's? OT EVAL LOW COMPLEXITY: 07983;OT RE-EVAL: 87128;OT THER ACT EA 15 MIN: 77838RL;OT SELF CARE/MGMT/TRAIN 15 MIN: 05100;OT MANUAL THERAPY EA 15 MIN: 45884;OT BIS XTRACELL FLUID ANALYSIS: 11920;OT CARE PLAN EA 15 MIN;OT ORTHOTIC MGMT/TRAIN EA 15 MIN: 42568;OT ORTHO/PROSTHET CHECKOUT EA 15 MIN: 40279  -TD     Planned Therapy Interventions (Optional Details) home exercise program;manual therapy techniques;stretching;strengthening;ROM (Range of Motion);prosthetic fitting/training;patient/family education;orthotic fitting/training  -TD     OT Plan Comments continue POC  -TD               User Key  (r) = Recorded By, (t) = Taken By, (c) = Cosigned By      Initials Name Provider Type    TD Gail Pacheco OT Occupational Therapist                              Time Calculation:   Timed Charges  60767 - OT Self Care/Mgmt Minutes: 15  Total Minutes  Timed Charges Total Minutes: 15   Total Minutes: 15     Therapy Charges for Today       Code Description Service Date Service Provider  Modifiers Qty    89631311850 HC PT BIS XTRACELL FLUID ANALYSIS 8/7/2023 Gail Pacheco OT  1    [id number] HC OT SELF CARE/MGMT/TRAIN EA 15 MIN 8/7/2023 Gail Pacheco OT GO 1                      Gail Pacheco OT  8/7/2023

## 2023-08-08 RX ORDER — SERTRALINE HYDROCHLORIDE 25 MG/1
25 TABLET, FILM COATED ORAL DAILY
Qty: 30 TABLET | Refills: 5 | Status: SHIPPED | OUTPATIENT
Start: 2023-08-08

## 2023-08-10 ENCOUNTER — OFFICE VISIT (OUTPATIENT)
Dept: PLASTIC SURGERY | Facility: CLINIC | Age: 68
End: 2023-08-10
Payer: MEDICARE

## 2023-08-10 ENCOUNTER — OFFICE VISIT (OUTPATIENT)
Dept: SURGERY | Facility: CLINIC | Age: 68
End: 2023-08-10
Payer: MEDICARE

## 2023-08-10 VITALS
TEMPERATURE: 97.6 F | BODY MASS INDEX: 24.39 KG/M2 | DIASTOLIC BLOOD PRESSURE: 81 MMHG | WEIGHT: 129.2 LBS | SYSTOLIC BLOOD PRESSURE: 162 MMHG | HEIGHT: 61 IN

## 2023-08-10 VITALS — RESPIRATION RATE: 18 BRPM | HEIGHT: 62 IN | BODY MASS INDEX: 23.74 KG/M2 | WEIGHT: 129 LBS

## 2023-08-10 DIAGNOSIS — C50.811 MALIGNANT NEOPLASM OF OVERLAPPING SITES OF RIGHT BREAST IN FEMALE, ESTROGEN RECEPTOR POSITIVE: Primary | ICD-10-CM

## 2023-08-10 DIAGNOSIS — Z17.0 MALIGNANT NEOPLASM OF OVERLAPPING SITES OF RIGHT BREAST IN FEMALE, ESTROGEN RECEPTOR POSITIVE: Primary | ICD-10-CM

## 2023-08-10 DIAGNOSIS — N65.1 DISPROPORTION BETWEEN NATIVE BREAST AND RECONSTRUCTED BREAST: ICD-10-CM

## 2023-08-10 NOTE — PROGRESS NOTES
Chief Complaint  Follow-up    Subjective          History of Present Illness  The patient is here to follow up on needle localized lumpectomy with SLN.  They are doing well and have no complaints.  Pathology is shown below:    Results for orders placed or performed during the hospital encounter of 06/20/23   Tissue Pathology Exam    Specimen: A: Breast, Right; Tissue    B: Breast, Right; Tissue    C: Breast, Right; Tissue    E: [place] Lymph Node; Tissue    F: [place] Lymph Node; Tissue   Result Value Ref Range    Case Report       Surgical Pathology Report                         Case: [id number]                                  Authorizing Provider:  Laura Goff MD    Collected:           06/20/2023 02:16 PM          Ordering Location:     [facility]  Received:            06/21/2023 07:35 AM                                 OR                                                                           Pathologist:           Gregg Wright MD                                                            Specimens:   1) - Breast, Right, RIGHT BREAST NEEDLE LOCALIZED MASS SHORT STITCH SUPERIOR, LONG                  STITCH LATERAL                                                                                      2) - Breast, Right, Right breast tissue, new posterior margin, clips on new margin                  3) - Breast, Right, Right breast tissue, new superior margin                                        4) - [place] Lymph Node, Right breast sentinel node, Count 601                                      5) - [place] Lymph Node, Right breast sentinel node, Count 160                            Clinical Information       Malignant neoplasm of right female breast, unspecified estrogen receptor status, unspecified site of breast      Final Diagnosis       1. Right breast mass, excision:   - Invasive carcinoma of no special type (ductal)   - High grade ductal carcinoma in situ (DCIS)   - See  "synoptic checklist    2. 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 Right breast, new superior margin, excision:   - Negative for carcinoma   - See synoptic checklist    4. Right breast sentinel node, count 601, excision:   - One lymph node positive for macrometastatic carcinoma (1/1)   - Greatest dimension of invasive focus: 6mm   - See synoptic checklist    5. Right breast sentinel node, count 160, excision:   - One lymph node negative for carcinoma (0/1)   - See synoptic checklist        Synoptic Checklist       INVASIVE CARCINOMA OF THE BREAST: Resection   INVASIVE CARCINOMA OF THE BREAST: COMPLETE EXCISION - All Specimens   8th Edition - Protocol posted: 3/22/2023      SPECIMEN      Procedure:    Excision (less than total mastectomy)       Specimen Laterality:    Right       TUMOR      Histologic Type:    Invasive carcinoma of no special type (ductal)       Histologic Grade ([place] Histologic Score):            Glandular (Acinar) / Tubular Differentiation:    Score 3         Nuclear Pleomorphism:    Score 3         Mitotic Rate:    Score 2         Overall Grade:    Grade 3 (scores of 8 or 9)       Tumor Size:    Greatest dimension of largest invasive focus (Millimeters): 14 mm      Tumor Focality:    Single focus of invasive carcinoma       Ductal Carcinoma In Situ (DCIS):    Present         :    Negative for extensive intraductal component (EIC)         Nuclear Grade:    Grade III (high)         Necrosis:    Present, central (expansive \"comedo\" necrosis)       Lymphatic and / or Vascular Invasion:    Present         :    Focal       Treatment Effect in the Breast:    No known presurgical therapy       MARGINS      Margin Status for Invasive Carcinoma:    All margins negative for invasive carcinoma         Distance from Invasive Carcinoma to Closest Margin:    3 mm        " "Closest Margin(s) to Invasive Carcinoma:    Posterior       Margin Status for DCIS:    All margins negative for DCIS         Distance from DCIS to Closest Margin:    6 mm        Closest Margin(s) to DCIS:    Posterior       REGIONAL LYMPH NODES      Regional Lymph Node Status:            :    Tumor present in regional lymph node(s)           Number of Lymph Nodes with Macrometastases:    1           Number of Lymph Nodes with Micrometastases:    0           Size of Largest [place] Metastatic Deposit:    6 mm          Extranodal Extension:    Not identified         Total Number of Lymph Nodes Examined (sentinel and non-sentinel):    2         Number of [place] Nodes Examined:    2       pTNM CLASSIFICATION (AJCC 8th Edition)      Reporting of pT, pN, and (when applicable) pM categories is based on information available to the pathologist at the time the report is issued. As per the AJCC (Chapter 1, 8th Ed.) it is the managing physician's responsibility to establish the final pathologic stage based upon all pertinent information, including but potentially not limited to this pathology report.      pT Category:    pT1c       pN Category:    pN1a       N Suffix:    (sn)       SPECIAL STUDIES      Estrogen Receptor (ER) Status:    Positive (greater than 10% of cells demonstrate nuclear positivity)         Percentage of Cells with Nuclear Positivity:    90 %      Progesterone Receptor (PgR) Status:    Positive         Percentage of Cells with Nuclear Positivity:    80 %      HER2 (by in situ hybridization):    Negative (not amplified)       Ki-67 Percentage of Positive Nuclei:    15 %      Testing Performed on Case Number:    [id number]       Gross Description       1. Breast, Right.  Received fresh on an Accu grid and subsequently placed in formalin, labeled \"right breast needle localized mass, short stitch superior, long stitch lateral\" is a 70 g, 7.0 x 6.5 x 3.0 cm (M/L x A/P x S/I) right lumpectomy specimen received " "with 2 stitches and a needle localization wire protruding from the anteroinferior aspect.  No skin is present.  The specimen is differentially inked (see ink key) and serially section from medial to lateral to reveal a 2.5 x 2.0 x 1.5 cm tan, fibrous, indurated, ill-defined mass with a hemorrhagic biopsy cavity, possibly involving the posterior margin and located 0.5 cm from the anterior margin, 2.5 cm of the superior margin, 2.2 cm of the inferior margin, and 2.0 cm of both the medial and lateral margins.  The remaining uninvolved parenchyma includes 15% white fibrous tissue and 85% lobulated adipose tissue.  Representative sections are submitted as follows: 1A-lesion with posterior margin and section of nearest inferior margin; 1B-lesion with anterior and posterior margins and section of anterior margin; 1C-lesion with posterior margin and section of nearest superior margin; 1D-lesion with anterior and posterior margins; 1E-1F-lesion with biopsy cavity and posterior margin; 1G-additional section of lesion; 1H-fibrous tissue at superior margin; 1I-medial and lateral margins.  Ink key: anterior-green, inferior-blue, lateral-orange, medial-yellow, posterior-black, superior-red.  Cold ischemic time-17 minutes;Total formalin fixation time-30 hours and 22 minutes.    2. Breast, Right.  Received fresh and subsequently placed in formalin, labeled \"right breast tissue, new posterior margin, clips on new margin\" is a 3 g, 4.5 x 2.5 x 0.7 cm fragment of right breast parenchyma received with clips on 1 aspect designating the new posterior margin (inked blue).  Sectioning reveals lobulated adipose tissue.  The specimen is entirely submitted in 4 cassettes.    3. Breast, Right.  Received fresh and subsequently placed in formalin, labeled \"right breast tissue, new superior margin\" is a 4 g, 3.5 x 2.5 x 1.0 cm fragment of right breast parenchyma with clips on 1 aspect designating the new superior margin (inked blue).  Sectioning " "reveals lobulated adipose tissue and scant fibrous tissue along the new margin.  Representative sections are submitted in 3 cassettes.    4. [place] Lymph Node.  Received fresh and subsequently placed in formalin, labeled \"right breast sentinel node, count 601\" is a 2.0 cm lymph node encased in adipose tissue.  Sectioning reveals a tan-pink, rubbery cut surface.  The lymph node is entirely submitted in 2 cassettes.  5. [place] Lymph Node.  Received fresh and subsequently placed in formalin, labeled \"right breast sentinel node, count 160\" is a 3.0 cm disrupted lymph node encased in adipose tissue.  Sectioning reveals a tan, rubbery cut surface.  The lymph node is entirely submitted in 2 cassettes. BRITTANY      Special Stains       Immunohistochemical stains for P63 are performed on blocks 2A and 2D to assist in evaluating margins and show glands both with retained peripheral myoepithelial staining consistent with ductal carcinoma in situ and glands with absent peripheral myoepithelial staining consistent with invasive carcinoma. The control stains appropriately.      Microscopic Description       Microscopic examination performed.     Tissue Pathology Exam    Specimen: D: Breast, Left; Tissue    G: Breast, Right; Tissue   Result Value Ref Range    Case Report       Surgical Pathology Report                         Case: [id number]                                  Authorizing Provider:  Caren Martins MD  Collected:           06/20/2023 02:18 PM          Ordering Location[facility]     [facility]  Received:            06/22/2023 10:12 AM                                 OR                                                                           Pathologist:           Jamilah Espinoza MD                                                     Specimens:   1) - Breast, Left, Left breast tissue                                                               2) - Breast, Right, Right breast new anterior margin  " "                                     Clinical Information       Malignant neoplasm of right female breast, unspecified estrogen receptor status, unspecified site of breast      Final Diagnosis       1.  Left breast, excision:   -Radial scar   -Nonproliferative fibrocystic change   -Skin with changes suggestive of seborrheic keratosis   -Negative for malignancy    2.  Right breast, excision of new anterior margin:   -Unremarkable skin and breast tissue   -Negative for malignancy          Gross Description       1. Breast, Left.  Received fresh and subsequently placed in formalin, labeled \"left breast tissue\" is a 121 g, 9.0 x 9.0 x 3.5 cm aggregate of left breast fragments surfaced with fragments of skin ranging in size from 3.0-7.0 cm.  The tan, wrinkled skin displays a 0.6 x 0.5 x 0.2 cm tan, wrinkled, raised lesion, located 1.5 cm from the nearest skin margin.  Sectioning reveals 10% white fibrous tissue and 90% lobulated adipose tissue.  Representative sections are submitted in 3 cassettes, to include the skin lesion in 1A.    2. Breast, Right.  Received fresh and subsequent placed in formalin, labeled \"right breast, new anterior margin\" is a 41 g, 8.0 cm aggregate of detached, unoriented right breast skin fragments ranging in size from 4.0-8.0 cm, excised to a depth of 0.2-2.0 cm.  The tan, wrinkled skin is grossly unremarkable.  Sectioning reveals lobulated adipose tissue.  Representative sections are submitted in 1 cassette. BRITTANY      Microscopic Description       Microscopic examination performed.          Objective   Vital Signs:   Resp 18   Ht 156.2 cm (61.5\")   Wt 58.5 kg (129 lb)   BMI 23.98 kg/mý     Physical Exam  Vitals and nursing note reviewed.   Constitutional:       General: She is not in acute distress.     Appearance: Normal appearance. She is well-developed.   HENT:      Head: Normocephalic and atraumatic.   Eyes:      Extraocular Movements: Extraocular movements intact.      Pupils: Pupils " are equal, round, and reactive to light.   Cardiovascular:      Pulses: Normal pulses.   Pulmonary:      Effort: Pulmonary effort is normal. No retractions.      Breath sounds: Normal air entry. No wheezing.   Abdominal:      General: There is no distension.      Palpations: Abdomen is soft.      Tenderness: There is no abdominal tenderness.      Hernia: No hernia is present.   Musculoskeletal:         General: No swelling or deformity.      Cervical back: Neck supple.   Skin:     General: Skin is warm and dry.      Findings: No erythema.      Comments: Surgical Incision Healing Well   Neurological:      General: No focal deficit present.      Mental Status: She is alert and oriented to person, place, and time.      Motor: Motor function is intact.   Psychiatric:         Mood and Affect: Mood normal.         Thought Content: Thought content normal.          Result Review :                 Assessment and Plan    Diagnoses and all orders for this visit:    1. Malignant neoplasm of overlapping sites of right breast in female, estrogen receptor positive (Primary)    Keep apt with oncology  Refer to rad onc  Followup after annual mammogram    Follow Up   Return in about 1 year (around 8/10/2024).  Patient was given instructions and counseling regarding her condition or for health maintenance advice. Please see specific information pulled into the AVS if appropriate.

## 2023-08-11 PROBLEM — N65.1 DISPROPORTION BETWEEN NATIVE BREAST AND RECONSTRUCTED BREAST: Status: ACTIVE | Noted: 2023-08-11

## 2023-08-11 PROCEDURE — 77263 THER RADIOLOGY TX PLNG CPLX: CPT | Performed by: RADIOLOGY

## 2023-08-15 ENCOUNTER — HOSPITAL ENCOUNTER (OUTPATIENT)
Dept: RADIATION ONCOLOGY | Facility: HOSPITAL | Age: 68
Discharge: HOME | End: 2023-08-15
Payer: COMMERCIAL

## 2023-08-15 DIAGNOSIS — C50.811 MALIGNANT NEOPLASM OF OVERLAPPING SITES OF RIGHT BREAST: ICD-10-CM

## 2023-08-15 PROCEDURE — 77332 RADIATION TREATMENT AID(S): CPT | Performed by: RADIOLOGY

## 2023-08-15 PROCEDURE — 77290 THER RAD SIMULAJ FIELD CPLX: CPT | Performed by: RADIOLOGY

## 2023-08-16 ENCOUNTER — PATIENT OUTREACH (OUTPATIENT)
Dept: ONCOLOGY | Facility: HOSPITAL | Age: 68
End: 2023-08-16
Payer: COMMERCIAL

## 2023-08-22 ENCOUNTER — HOSPITAL ENCOUNTER (OUTPATIENT)
Dept: RADIATION ONCOLOGY | Facility: HOSPITAL | Age: 68
Discharge: HOME | End: 2023-08-22

## 2023-08-22 ENCOUNTER — HOSPITAL ENCOUNTER (OUTPATIENT)
Dept: RADIATION ONCOLOGY | Facility: HOSPITAL | Age: 68
Discharge: HOME | End: 2023-08-22
Payer: COMMERCIAL

## 2023-08-22 VITALS
SYSTOLIC BLOOD PRESSURE: 194 MMHG | RESPIRATION RATE: 12 BRPM | OXYGEN SATURATION: 100 % | HEART RATE: 57 BPM | DIASTOLIC BLOOD PRESSURE: 91 MMHG | BODY MASS INDEX: 24.55 KG/M2 | TEMPERATURE: 98.9 F | WEIGHT: 132.06 LBS

## 2023-08-22 DIAGNOSIS — C50.811 MALIGNANT NEOPLASM OF OVERLAPPING SITES OF RIGHT BREAST IN FEMALE, ESTROGEN RECEPTOR POSITIVE: Primary | ICD-10-CM

## 2023-08-22 DIAGNOSIS — Z17.0 MALIGNANT NEOPLASM OF OVERLAPPING SITES OF RIGHT BREAST IN FEMALE, ESTROGEN RECEPTOR POSITIVE: Primary | ICD-10-CM

## 2023-08-22 LAB
RAD ONC ARIA COURSE ID: NORMAL
RAD ONC ARIA COURSE INTENT: NORMAL
RAD ONC ARIA COURSE LAST TREATMENT DATE: NORMAL
RAD ONC ARIA COURSE START DATE: NORMAL
RAD ONC ARIA COURSE TREATMENT ELAPSED DAYS: 0
RAD ONC ARIA FIRST TREATMENT DATE: NORMAL
RAD ONC ARIA PLAN FRACTIONS TREATED TO DATE: 1
RAD ONC ARIA PLAN FRACTIONS TREATED TO DATE: 1
RAD ONC ARIA PLAN ID: NORMAL
RAD ONC ARIA PLAN ID: NORMAL
RAD ONC ARIA PLAN PRESCRIBED DOSE PER FRACTION: 2 GY
RAD ONC ARIA PLAN PRESCRIBED DOSE PER FRACTION: 2 GY
RAD ONC ARIA PLAN PRIMARY REFERENCE POINT: NORMAL
RAD ONC ARIA PLAN PRIMARY REFERENCE POINT: NORMAL
RAD ONC ARIA PLAN TOTAL FRACTIONS PRESCRIBED: 25
RAD ONC ARIA PLAN TOTAL FRACTIONS PRESCRIBED: 25
RAD ONC ARIA PLAN TOTAL PRESCRIBED DOSE: 5000 CGY
RAD ONC ARIA PLAN TOTAL PRESCRIBED DOSE: 5000 CGY
RAD ONC ARIA REFERENCE POINT DOSAGE GIVEN TO DATE: 2 GY
RAD ONC ARIA REFERENCE POINT DOSAGE GIVEN TO DATE: 2 GY
RAD ONC ARIA REFERENCE POINT ID: NORMAL
RAD ONC ARIA REFERENCE POINT ID: NORMAL
RAD ONC ARIA REFERENCE POINT SESSION DOSAGE GIVEN: 2 GY
RAD ONC ARIA REFERENCE POINT SESSION DOSAGE GIVEN: 2 GY

## 2023-08-22 PROCEDURE — 77300 RADIATION THERAPY DOSE PLAN: CPT | Performed by: RADIOLOGY

## 2023-08-22 PROCEDURE — 77295 3-D RADIOTHERAPY PLAN: CPT | Performed by: RADIOLOGY

## 2023-08-22 PROCEDURE — 77334 RADIATION TREATMENT AID(S): CPT | Performed by: RADIOLOGY

## 2023-08-22 PROCEDURE — 77280 THER RAD SIMULAJ FIELD SMPL: CPT | Performed by: RADIOLOGY

## 2023-08-22 PROCEDURE — 77412 RADIATION TX DELIVERY LVL 3: CPT | Performed by: RADIOLOGY

## 2023-08-22 PROCEDURE — 77387 GUIDANCE FOR RADJ TX DLVR: CPT | Performed by: RADIOLOGY

## 2023-08-22 NOTE — PROGRESS NOTES
On Treatment Visit       Patient: Arely Huerta   YOB: 1955   Medical Record Number: [id number]     Date of Visit  August 22, 2023   Primary Diagnosis:Malignant neoplasm of overlapping sites of right breast in female, estrogen receptor positive [C50.811, Z17.0]  Cancer Staging: Cancer Staging   Malignant neoplasm of overlapping sites of right breast in female, estrogen receptor positive  Staging form: Breast, AJCC 8th Edition  - Clinical: cT1c, cN0, cM0, ER+, NC+, HER2- - Signed by David Vazquez MD on 5/18/2023  - Pathologic: pT1c, pN1a, cM0, Oncotype DX score: 19 - Signed by David Vazquez MD on 7/20/2023         was seen today for an on treatment visit.  She is receiving radiation therapy to the right breast and axilla. She  has received 200 cGy in 1 fractions out of a planned dose of 5000 cGy in 25 fractions.    Today on exam the patient is tolerating radiation therapy well and has no new disease or treatment-related complaints.                                           Review of Systems:   Review of Systems   Constitutional:  Positive for fatigue (7/10). Negative for appetite change and unexpected weight change.   HENT:  Negative for hearing loss, sore throat, tinnitus and trouble swallowing.    Eyes:  Positive for visual disturbance (has very minimal vision in right eye).        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     Respiratory:  Negative for cough and shortness of breath.    Cardiovascular:  Negative for chest pain, palpitations and leg swelling.        Hx of mitral valve prolapse. Taking Atenolol for this.  Per pt, her cardiologist likes to keep her heart rate around 50.    Gastrointestinal:  Negative for anal bleeding, blood in stool, constipation, diarrhea, nausea and vomiting.   Endocrine: Positive for heat intolerance (occasionally since surgery).   Genitourinary:  Negative for difficulty urinating, dysuria, frequency, hematuria and  urgency.   Musculoskeletal:  Positive for arthralgias (Ongoing), back pain (ongoing- ankylosing spondylitis), neck pain (d/t arthritis- ongoing) and neck stiffness (d/t arthritis- ongoing). Negative for joint swelling.   Skin:  Negative for color change and rash.   Allergic/Immunologic: Negative.    Neurological:  Positive for headaches (frequent mild headaches, taking excedrine for this with relief- ongoing since young adult). Negative for dizziness and weakness.   Hematological:  Bruises/bleeds easily (takes aspirin for headaches.).   Psychiatric/Behavioral:  Negative for sleep disturbance.      Vitals:     Vitals:    08/22/23 1431   BP: (!) 194/91   Pulse: 57   Resp: 12   Temp: 98.9 øF (37.2 øC)   SpO2: 100%       Weight:   Wt Readings from Last 3 Encounters:   08/22/23 59.9 kg (132 lb 0.9 oz)   08/10/23 58.5 kg (129 lb)   08/10/23 58.6 kg (129 lb 3.2 oz)      Pain:    Pain Score    08/22/23 1431   PainSc: 0-No pain         Physical Exam:  Gen: WD/WN; NAD  HEENT: MMM  Trachea: midline  Chest: symmetric  Resp: normal respiratory effort  Extr: warm, well-perfused  Neuro: awake and alert; no aphasia or neglect  Psyche: Tearful and anxious    Plan: I have reviewed treatment setup notes, checked and approved the daily guidance images.  I reviewed dose delivery, treatment parameters and deemed them appropriate. We plan to continue radiation therapy as prescribed.    Hypertensive presumably due to anxiety; will continue to monitor      Radiation Oncology    Electronically signed by Orion Mckinney MD 8/22/2023  14:43 EDT

## 2023-08-23 ENCOUNTER — HOSPITAL ENCOUNTER (OUTPATIENT)
Dept: RADIATION ONCOLOGY | Facility: HOSPITAL | Age: 68
Discharge: HOME | End: 2023-08-23
Payer: COMMERCIAL

## 2023-08-23 LAB
RAD ONC ARIA COURSE ID: NORMAL
RAD ONC ARIA COURSE INTENT: NORMAL
RAD ONC ARIA COURSE LAST TREATMENT DATE: NORMAL
RAD ONC ARIA COURSE START DATE: NORMAL
RAD ONC ARIA COURSE TREATMENT ELAPSED DAYS: 1
RAD ONC ARIA FIRST TREATMENT DATE: NORMAL
RAD ONC ARIA PLAN FRACTIONS TREATED TO DATE: 2
RAD ONC ARIA PLAN FRACTIONS TREATED TO DATE: 2
RAD ONC ARIA PLAN ID: NORMAL
RAD ONC ARIA PLAN ID: NORMAL
RAD ONC ARIA PLAN PRESCRIBED DOSE PER FRACTION: 2 GY
RAD ONC ARIA PLAN PRESCRIBED DOSE PER FRACTION: 2 GY
RAD ONC ARIA PLAN PRIMARY REFERENCE POINT: NORMAL
RAD ONC ARIA PLAN PRIMARY REFERENCE POINT: NORMAL
RAD ONC ARIA PLAN TOTAL FRACTIONS PRESCRIBED: 25
RAD ONC ARIA PLAN TOTAL FRACTIONS PRESCRIBED: 25
RAD ONC ARIA PLAN TOTAL PRESCRIBED DOSE: 5000 CGY
RAD ONC ARIA PLAN TOTAL PRESCRIBED DOSE: 5000 CGY
RAD ONC ARIA REFERENCE POINT DOSAGE GIVEN TO DATE: 4 GY
RAD ONC ARIA REFERENCE POINT DOSAGE GIVEN TO DATE: 4 GY
RAD ONC ARIA REFERENCE POINT ID: NORMAL
RAD ONC ARIA REFERENCE POINT ID: NORMAL
RAD ONC ARIA REFERENCE POINT SESSION DOSAGE GIVEN: 2 GY
RAD ONC ARIA REFERENCE POINT SESSION DOSAGE GIVEN: 2 GY

## 2023-08-23 PROCEDURE — 77412 RADIATION TX DELIVERY LVL 3: CPT | Performed by: RADIOLOGY

## 2023-08-23 PROCEDURE — 77387 GUIDANCE FOR RADJ TX DLVR: CPT | Performed by: RADIOLOGY

## 2023-08-24 ENCOUNTER — HOSPITAL ENCOUNTER (OUTPATIENT)
Dept: BONE DENSITY | Facility: HOSPITAL | Age: 68
Discharge: HOME | End: 2023-08-24
Admitting: INTERNAL MEDICINE
Payer: COMMERCIAL

## 2023-08-24 ENCOUNTER — HOSPITAL ENCOUNTER (OUTPATIENT)
Dept: RADIATION ONCOLOGY | Facility: HOSPITAL | Age: 68
Discharge: HOME | End: 2023-08-24
Payer: MEDICARE

## 2023-08-24 DIAGNOSIS — C50.811 MALIGNANT NEOPLASM OF OVERLAPPING SITES OF RIGHT BREAST IN FEMALE, ESTROGEN RECEPTOR POSITIVE: ICD-10-CM

## 2023-08-24 DIAGNOSIS — Z17.0 MALIGNANT NEOPLASM OF OVERLAPPING SITES OF RIGHT BREAST IN FEMALE, ESTROGEN RECEPTOR POSITIVE: ICD-10-CM

## 2023-08-24 DIAGNOSIS — Z79.811 PREVENTATIVE USE OF AROMATASE INHIBITORS: ICD-10-CM

## 2023-08-24 LAB
RAD ONC ARIA COURSE ID: NORMAL
RAD ONC ARIA COURSE INTENT: NORMAL
RAD ONC ARIA COURSE LAST TREATMENT DATE: NORMAL
RAD ONC ARIA COURSE START DATE: NORMAL
RAD ONC ARIA COURSE TREATMENT ELAPSED DAYS: 2
RAD ONC ARIA FIRST TREATMENT DATE: NORMAL
RAD ONC ARIA PLAN FRACTIONS TREATED TO DATE: 3
RAD ONC ARIA PLAN FRACTIONS TREATED TO DATE: 3
RAD ONC ARIA PLAN ID: NORMAL
RAD ONC ARIA PLAN ID: NORMAL
RAD ONC ARIA PLAN PRESCRIBED DOSE PER FRACTION: 2 GY
RAD ONC ARIA PLAN PRESCRIBED DOSE PER FRACTION: 2 GY
RAD ONC ARIA PLAN PRIMARY REFERENCE POINT: NORMAL
RAD ONC ARIA PLAN PRIMARY REFERENCE POINT: NORMAL
RAD ONC ARIA PLAN TOTAL FRACTIONS PRESCRIBED: 25
RAD ONC ARIA PLAN TOTAL FRACTIONS PRESCRIBED: 25
RAD ONC ARIA PLAN TOTAL PRESCRIBED DOSE: 5000 CGY
RAD ONC ARIA PLAN TOTAL PRESCRIBED DOSE: 5000 CGY
RAD ONC ARIA REFERENCE POINT DOSAGE GIVEN TO DATE: 6 GY
RAD ONC ARIA REFERENCE POINT DOSAGE GIVEN TO DATE: 6 GY
RAD ONC ARIA REFERENCE POINT ID: NORMAL
RAD ONC ARIA REFERENCE POINT ID: NORMAL
RAD ONC ARIA REFERENCE POINT SESSION DOSAGE GIVEN: 2 GY
RAD ONC ARIA REFERENCE POINT SESSION DOSAGE GIVEN: 2 GY

## 2023-08-24 PROCEDURE — 77080 DXA BONE DENSITY AXIAL: CPT

## 2023-08-24 PROCEDURE — 77412 RADIATION TX DELIVERY LVL 3: CPT | Performed by: RADIOLOGY

## 2023-08-24 PROCEDURE — 77387 GUIDANCE FOR RADJ TX DLVR: CPT | Performed by: RADIOLOGY

## 2023-08-25 ENCOUNTER — HOSPITAL ENCOUNTER (OUTPATIENT)
Dept: RADIATION ONCOLOGY | Facility: HOSPITAL | Age: 68
Discharge: HOME | End: 2023-08-25
Payer: COMMERCIAL

## 2023-08-25 ENCOUNTER — DOCUMENTATION (OUTPATIENT)
Dept: RADIATION ONCOLOGY | Facility: HOSPITAL | Age: 68
End: 2023-08-25
Payer: COMMERCIAL

## 2023-08-25 LAB
RAD ONC ARIA COURSE ID: NORMAL
RAD ONC ARIA COURSE INTENT: NORMAL
RAD ONC ARIA COURSE LAST TREATMENT DATE: NORMAL
RAD ONC ARIA COURSE START DATE: NORMAL
RAD ONC ARIA COURSE TREATMENT ELAPSED DAYS: 3
RAD ONC ARIA FIRST TREATMENT DATE: NORMAL
RAD ONC ARIA PLAN FRACTIONS TREATED TO DATE: 4
RAD ONC ARIA PLAN FRACTIONS TREATED TO DATE: 4
RAD ONC ARIA PLAN ID: NORMAL
RAD ONC ARIA PLAN ID: NORMAL
RAD ONC ARIA PLAN PRESCRIBED DOSE PER FRACTION: 2 GY
RAD ONC ARIA PLAN PRESCRIBED DOSE PER FRACTION: 2 GY
RAD ONC ARIA PLAN PRIMARY REFERENCE POINT: NORMAL
RAD ONC ARIA PLAN PRIMARY REFERENCE POINT: NORMAL
RAD ONC ARIA PLAN TOTAL FRACTIONS PRESCRIBED: 25
RAD ONC ARIA PLAN TOTAL FRACTIONS PRESCRIBED: 25
RAD ONC ARIA PLAN TOTAL PRESCRIBED DOSE: 5000 CGY
RAD ONC ARIA PLAN TOTAL PRESCRIBED DOSE: 5000 CGY
RAD ONC ARIA REFERENCE POINT DOSAGE GIVEN TO DATE: 8 GY
RAD ONC ARIA REFERENCE POINT DOSAGE GIVEN TO DATE: 8 GY
RAD ONC ARIA REFERENCE POINT ID: NORMAL
RAD ONC ARIA REFERENCE POINT ID: NORMAL
RAD ONC ARIA REFERENCE POINT SESSION DOSAGE GIVEN: 2 GY
RAD ONC ARIA REFERENCE POINT SESSION DOSAGE GIVEN: 2 GY

## 2023-08-25 PROCEDURE — 77387 GUIDANCE FOR RADJ TX DLVR: CPT | Performed by: RADIOLOGY

## 2023-08-25 PROCEDURE — 77412 RADIATION TX DELIVERY LVL 3: CPT | Performed by: RADIOLOGY

## 2023-08-25 NOTE — PROGRESS NOTES
Diagnosis:Breast Cancer    Reason for Referral: Rounding with new patients at [facility]    Content of Visit: OSW met with patient to discuss her healthcare needs. Patient stated physical health wise she feels good and spoke highly of all of her health care team. Emotionally, patient stated she began crying when her  was discussing the weather and he advised her to call her oncologist for depression medication. Patient stated she called the oncologist and he prescribed her zoloft however she has not picked it up at the pharmacy yet. OSW encouraged the patient to begin the medication and educated her that she needs to give it a few weeks before she feels the full effect but a few doses should make a difference. Patient was educated on the signs and symptoms of depression and to contact her primary care provider who prescribed wellbutrin to see if she should continue that prescription since it was for treating smoking cessation. Patient stated she has not smoked since June 2023 and feels she has achieved smoking cessation. Patient stated she has not had a pap smear in yrs. And understood her doctor to say after menopause she did not need any prevention care. OSW encouraged patient to contact [facility] and schedule a pap smear. Patient stated she would contact her PCP to discuss her zoloft and wellbutrin to address her depression and smoking cessation and after radiation treatments are completed she plans to schedule a visit with [facility] to schedule a pap smear. Patient stated she appreciated OSW educating her on the signs and symptoms of depression and the encouragement to include prevention for women's health. Patient stated she felt better after disclosing her thoughts and feelings with OSW and receiving validation she is normal even though she has crying spells. OSW provided emotional support through active listening, empathizing, normalizing and validating of patient's  thoughts and feelings.     Resources/Referrals Provided: PCP and [place] [facility].

## 2023-08-28 ENCOUNTER — OFFICE VISIT (OUTPATIENT)
Dept: ONCOLOGY | Facility: HOSPITAL | Age: 68
End: 2023-08-28
Payer: COMMERCIAL

## 2023-08-28 ENCOUNTER — HOSPITAL ENCOUNTER (OUTPATIENT)
Dept: RADIATION ONCOLOGY | Facility: HOSPITAL | Age: 68
Discharge: HOME | End: 2023-08-28
Payer: MEDICARE

## 2023-08-28 VITALS
HEIGHT: 61 IN | HEART RATE: 47 BPM | WEIGHT: 131.84 LBS | DIASTOLIC BLOOD PRESSURE: 58 MMHG | BODY MASS INDEX: 24.89 KG/M2 | SYSTOLIC BLOOD PRESSURE: 159 MMHG | RESPIRATION RATE: 18 BRPM | OXYGEN SATURATION: 100 % | TEMPERATURE: 97.1 F

## 2023-08-28 DIAGNOSIS — M85.89 OSTEOPENIA OF MULTIPLE SITES: Primary | ICD-10-CM

## 2023-08-28 DIAGNOSIS — Z17.0 MALIGNANT NEOPLASM OF OVERLAPPING SITES OF RIGHT BREAST IN FEMALE, ESTROGEN RECEPTOR POSITIVE: ICD-10-CM

## 2023-08-28 DIAGNOSIS — C50.811 MALIGNANT NEOPLASM OF OVERLAPPING SITES OF RIGHT BREAST IN FEMALE, ESTROGEN RECEPTOR POSITIVE: ICD-10-CM

## 2023-08-28 PROBLEM — C80.1 CANCER: Status: RESOLVED | Noted: 2023-07-18 | Resolved: 2023-08-28

## 2023-08-28 LAB
RAD ONC ARIA COURSE ID: NORMAL
RAD ONC ARIA COURSE INTENT: NORMAL
RAD ONC ARIA COURSE LAST TREATMENT DATE: NORMAL
RAD ONC ARIA COURSE START DATE: NORMAL
RAD ONC ARIA COURSE TREATMENT ELAPSED DAYS: 6
RAD ONC ARIA FIRST TREATMENT DATE: NORMAL
RAD ONC ARIA PLAN FRACTIONS TREATED TO DATE: 5
RAD ONC ARIA PLAN FRACTIONS TREATED TO DATE: 5
RAD ONC ARIA PLAN ID: NORMAL
RAD ONC ARIA PLAN ID: NORMAL
RAD ONC ARIA PLAN PRESCRIBED DOSE PER FRACTION: 2 GY
RAD ONC ARIA PLAN PRESCRIBED DOSE PER FRACTION: 2 GY
RAD ONC ARIA PLAN PRIMARY REFERENCE POINT: NORMAL
RAD ONC ARIA PLAN PRIMARY REFERENCE POINT: NORMAL
RAD ONC ARIA PLAN TOTAL FRACTIONS PRESCRIBED: 25
RAD ONC ARIA PLAN TOTAL FRACTIONS PRESCRIBED: 25
RAD ONC ARIA PLAN TOTAL PRESCRIBED DOSE: 5000 CGY
RAD ONC ARIA PLAN TOTAL PRESCRIBED DOSE: 5000 CGY
RAD ONC ARIA REFERENCE POINT DOSAGE GIVEN TO DATE: 10 GY
RAD ONC ARIA REFERENCE POINT DOSAGE GIVEN TO DATE: 10 GY
RAD ONC ARIA REFERENCE POINT ID: NORMAL
RAD ONC ARIA REFERENCE POINT ID: NORMAL
RAD ONC ARIA REFERENCE POINT SESSION DOSAGE GIVEN: 2 GY
RAD ONC ARIA REFERENCE POINT SESSION DOSAGE GIVEN: 2 GY

## 2023-08-28 PROCEDURE — 77412 RADIATION TX DELIVERY LVL 3: CPT | Performed by: RADIOLOGY

## 2023-08-28 PROCEDURE — 77387 GUIDANCE FOR RADJ TX DLVR: CPT | Performed by: RADIOLOGY

## 2023-08-28 PROCEDURE — 77336 RADIATION PHYSICS CONSULT: CPT | Performed by: RADIOLOGY

## 2023-08-28 PROCEDURE — G0463 HOSPITAL OUTPT CLINIC VISIT: HCPCS | Performed by: INTERNAL MEDICINE

## 2023-08-28 RX ORDER — ANASTROZOLE 1 MG/1
1 TABLET ORAL DAILY
Qty: 90 TABLET | Refills: 2 | Status: SHIPPED | OUTPATIENT
Start: 2023-08-28

## 2023-08-28 RX ORDER — BRIMONIDINE TARTRATE 2 MG/ML
SOLUTION/ DROPS OPHTHALMIC
COMMUNITY
Start: 2023-07-22

## 2023-08-28 NOTE — PROGRESS NOTES
Chief Complaint  RIGHT BREAST INVASIVE CARCINOMA    Emily Olivo MD Camas, Jennifer, MD    Subjective          Arely Huerta presents to [facility] for right sided breast cancer.    Ms. Huerta is a very pleasant 68 yo female who presents due to recent diagnosis of right sided breast cancer. She had abnormal screening mammogram. Right breast lumpectomy and sentinel lymph node biopsy on 6/20/23. 1 lymph node positive by sentinel node biopsy, no DEONTE. Oncotype of 19. She has recovered well from surgery. She has completed 2 of 5 weeks of adjuvant radiation. She was started on anastrozole at last visit in July and tolerating well. She does have hot flashes but they are manageable. She has chronic pain from her history of ankylosing spondylitis. She has some pain in her wrist and ankles but these pains come and go. She has episodes where she bursts into tears randomly. She is to discuss with her PCP about an anti-depressant. She was on Wellbutrin but this was to help her quit smoking.     Oncology/Hematology History Overview Note   4/18/23: Breast ultrasound done due to abnormal screening mammogram. Ultrasound demonstrated 1.8 cm lesion of right breast. Biopsy recommended    5/1/23: U/s guided biopsy of right breast lesion 8 o'clock, 2.5 cm from nipple: Positive for invasive mammary carcinoma, ductal with apocrine features, G2, 2.5 mm invasion, no LVI/PNI, ER positive at 90%, KY positive at 80%, HER2/leigh ann by FISH negative, Ki67 of 15%, p53 10% positive.     6/20/23: Right breast lumpectomy: Invasive carcinoma, DCIS, invasive component 14 mm in size, Grade 3, LVI present focally, margins negative, 1 lymph node present with 6 mm deposit, staged at [id number](sn). 90% ER positive, 80% KY positive. Oncotype 19. No adjuvant chemo recommended    7/20/23: Anastrazole 1 mg started      Malignant neoplasm of overlapping sites of right breast in female, estrogen receptor positive    5/18/2023 Initial Diagnosis    Malignant neoplasm of overlapping sites of right breast in female, estrogen receptor positive     5/18/2023 Cancer Staged    Staging form: Breast, AJCC 8th Edition  - Clinical: cT1c, cN0, cM0, ER+, AZ+, HER2- - Signed by David Vazquez MD on 5/18/2023 7/20/2023 Cancer Staged    Staging form: Breast, AJCC 8th Edition  - Pathologic: pT1c, pN1a, cM0, Oncotype DX score: 19 - Signed by David Vazquez MD on 7/20/2023     Malignant neoplasm of overlapping sites of right breast   8/15/2023 Initial Diagnosis    Malignant neoplasm of overlapping sites of right breast     8/15/2023 -  Radiation    RADIATION THERAPY Treatment Details (Noted on 8/15/2023)  Site: Right Chest wall  Technique: 3D CRT  Goal: No goal specified  Planned Treatment Start Date: No planned start date specified           Review of Systems   Constitutional:  Positive for fatigue.   Eyes:  Negative for blurred vision.   Cardiovascular:  Negative for palpitations.   Genitourinary:  Negative for breast pain.   All other systems reviewed and are negative.  Current Outpatient Medications on File Prior to Visit   Medication Sig Dispense Refill    brimonidine (ALPHAGAN) 0.2 % ophthalmic solution       albuterol sulfate  (90 Base) MCG/ACT inhaler Inhale 2 puffs Every 4 (Four) Hours As Needed for Wheezing.      amLODIPine (NORVASC) 5 MG tablet Take 1 tablet by mouth Daily.      anastrozole (ARIMIDEX) 1 MG tablet Take 1 tablet by mouth Daily. 30 tablet 1    aspirin  MG tablet       atenolol (TENORMIN) 100 MG tablet atenolol 100 mg oral tablet take 1 tablet (100 mg) by oral route once daily   Active      CBD oil (cannabidiol) capsule Take  by mouth.      Cetirizine HCl 10 MG capsule Take  by mouth.      Combigan 0.2-0.5 % ophthalmic solution       difluprednate (DUREZOL) 0.05 % ophthalmic emulsion Apply 1 drop to eye(s) as directed by provider.      fluticasone (FLONASE) 50 MCG/ACT nasal spray 1 spray  into the nostril(s) as directed by provider Daily.      folic acid (FOLVITE) 800 MCG tablet folic acid 800 mcg oral tablet take 1 tablet (0.8 mg) by oral route once daily   Active      levothyroxine (SYNTHROID, LEVOTHROID) 100 MCG tablet Take 1 tablet by mouth Daily.      loratadine (CLARITIN) 10 MG tablet Take 1 tablet by mouth Daily.      losartan (COZAAR) 100 MG tablet Take 1 tablet by mouth Daily.      omeprazole (priLOSEC) 40 MG capsule Take 1 capsule by mouth Daily.      rosuvastatin (CRESTOR) 20 MG tablet Take 1 tablet by mouth Daily.      sertraline (ZOLOFT) 25 MG tablet Take 1 tablet by mouth Daily. 30 tablet 5    Upadacitinib ER (Rinvoq) 15 MG tablet sustained-release 24 hour Take  by mouth.      Wellbutrin  MG 24 hr tablet Every 12 (Twelve) Hours.       No current facility-administered medications on file prior to visit.       Allergies   Allergen Reactions    Penicillins Anaphylaxis    Atorvastatin Unknown - Low Severity     Myalgia    Doxycycline Unknown - Low Severity     Abdominal pain    Lisinopril Unknown - Low Severity     cough    Simvastatin Unknown - Low Severity     Leg cramps    Sulfa Antibiotics Dizziness     Past Medical History:   Diagnosis Date    Acid reflux     Allergies     Ankylosing spondylitis     Breast cancer     Hypercholesteremia     Hypertension     Skin cancer     Thyroid condition      Past Surgical History:   Procedure Laterality Date    APPENDECTOMY      BREAST BIOPSY Right 6/20/2023    Procedure: RIGHT BREAST LUMPECTOMY WITH NEEDLE LOCALIZATION;  Surgeon: Laura Goff MD;  Location: [facility] [place];  Service: General;  Laterality: Right;    BREAST RECONSTRUCTION Bilateral 6/20/2023    Procedure: Bilateral breast reconstruction, left breast reduction, right breast oncoplastic closure with mastopexy;  Surgeon: Caren Martins MD;  Location: [facility] OR [facility] [place];  Service: Plastics;  Laterality: Bilateral;    CORNEAL TRANSPLANT Right     FOOT SURGERY      SINUS  "SURGERY      x6    TUBAL ABDOMINAL LIGATION       Social History     Socioeconomic History    Marital status:    Tobacco Use    Smoking status: Former     Packs/day: 1.00     Years: 50.00     Pack years: 50.00     Types: Cigarettes     Start date:      Quit date: 2023     Years since quittin.2     Passive exposure: Never    Smokeless tobacco: Never   Vaping Use    Vaping Use: Every day    Passive vaping exposure: Yes   Substance and Sexual Activity    Alcohol use: Defer    Drug use: Defer    Sexual activity: Defer     Family History   Problem Relation Age of Onset    Melanoma Father     Breast cancer Sister        Objective   Physical Exam  Well appearing patient in no acute distress on RA  No rash  Respirations non-labored  Awake, alert, and oriented x 4. Speech intact. No gross neurologic deficit  Appropriate mood and affect      Vitals:    23 0927   BP: 159/58   Pulse: (!) 47   Resp: 18   Temp: 97.1 øF (36.2 øC)   SpO2: 100%   Weight: 59.8 kg (131 lb 13.4 oz)   Height: 156.2 cm (61.5\")   PainSc: 0-No pain               PHQ-9 Total Score:                Result Review :   The following data was reviewed by: David Vazquez MD on 23:  Lab Results   Component Value Date    HGB 13.8 2022    HCT 41.6 2022    .4 (H) 2022     2022    WBC 9.19 2022    NEUTROABS 8.03 (H) 2022    LYMPHSABS 0.89 2022    MONOSABS 0.21 2022    EOSABS 0.01 2022    BASOSABS 0.02 2022     Lab Results   Component Value Date    GLUCOSE 111 (H) 2022    BUN 16 2022    CREATININE 1.13 (H) 2022     (L) 2022    K 4.5 2022    CL 99 2022    CO2 19.7 (L) 2022    CALCIUM 9.4 2022    PROTEINTOT 7.5 2022    ALBUMIN 4.50 2022    BILITOT 0.2 2022    ALKPHOS 79 2022    AST 25 2022    ALT 27 2022     No results found for: MG, PHOS, FREET4, TSH  Labs personally " "reviewed    8/22/23 rad onc note personally reviewed        No radiology results for the last 30 days.     Pathology reviewed    INVASIVE CARCINOMA OF THE BREAST: Resection   8th Edition - Protocol posted: 3/22/2023INVASIVE CARCINOMA OF THE BREAST: COMPLETE EXCISION - All Specimens  SPECIMEN   Procedure  Excision (less than total mastectomy)   Specimen Laterality  Right   TUMOR   Histologic Type  Invasive carcinoma of no special type (ductal)   Histologic Grade (Brandon Histologic Score)     Glandular (Acinar) / Tubular Differentiation  Score 3   Nuclear Pleomorphism  Score 3   Mitotic Rate  Score 2   Overall Grade  Grade 3 (scores of 8 or 9)   Tumor Size  Greatest dimension of largest invasive focus (Millimeters): 14 mm   Tumor Focality  Single focus of invasive carcinoma   Ductal Carcinoma In Situ (DCIS)  Present     Negative for extensive intraductal component (EIC)   Nuclear Grade  Grade III (high)   Necrosis  Present, central (expansive \"comedo\" necrosis)   Lymphatic and / or Vascular Invasion  Present     Focal   Treatment Effect in the Breast  No known presurgical therapy   MARGINS   Margin Status for Invasive Carcinoma  All margins negative for invasive carcinoma   Distance from Invasive Carcinoma to Closest Margin  3 mm   Closest Margin(s) to Invasive Carcinoma  Posterior   Margin Status for DCIS  All margins negative for DCIS   Distance from DCIS to Closest Margin  6 mm   Closest Margin(s) to DCIS  Posterior   REGIONAL LYMPH NODES   Regional Lymph Node Status  Tumor present in regional lymph node(s)   Number of Lymph Nodes with Macrometastases  1   Number of Lymph Nodes with Micrometastases  0   Size of Largest Hernandez Metastatic Deposit  6 mm   Extranodal Extension  Not identified   Total Number of Lymph Nodes Examined (sentinel and non-sentinel)  2   Number of [place] Nodes Examined  2   pTNM CLASSIFICATION (AJCC 8th Edition)   Reporting of pT, pN, and (when applicable) pM categories is based on " information available to the pathologist at the time the report is issued. As per the AJCC (Chapter 1, 8th Ed.) it is the managing physician's responsibility to establish the final pathologic stage based upon all pertinent information, including but potentially not limited to this pathology report.   pT Category  pT1c   pN Category  pN1a   N Suffix  (sn)   SPECIAL STUDIES        Estrogen Receptor (ER) Status  Positive (greater than 10% of cells demonstrate nuclear positivity)   Percentage of Cells with Nuclear Positivity  90 %        Progesterone Receptor (PgR) Status  Positive   Percentage of Cells with Nuclear Positivity  80 %        HER2 (by in situ hybridization)  Negative (not amplified)        Ki-67 Percentage of Positive Nuclei  15 %   Testing Performed on Case Number  [id number]          Assessment and Plan    Diagnoses and all orders for this visit:    1. Osteopenia of multiple sites (Primary)    2. Malignant neoplasm of overlapping sites of right breast in female, estrogen receptor positive  -     anastrozole (ARIMIDEX) 1 MG tablet; Take 1 tablet by mouth Daily.  Dispense: 90 tablet; Refill: 2    ER/AZ positive right breast cancer  Clinical T1c (1.8 cm) ER (90%) and AZ (80%) positive right breast cancer with HER2 negative by FISH. Right lumpectomy 6/20/23 with [id number] (sn) with 1 sentinel node positive with no DEONTE. Margins negative. Oncotype score of 19, therefore no chemotherapy will be recommended. Due to hormone positivity and post-menopausal status, recommendation will be for at least 5 years of adjuvant aromatase inhibitor. Anastrazole 1 mg daily started 7/20/23. Tolerating well Continue vitamin D and calcium. Repeat DEXA 8/24/23 with osteopenia, FRAX score of 9.2% and 1.2% for major osteoporotic fracture and hip fracture in next 10 years, respectively. Plan for DEXA every 2 years. RTC 6 months for toxicity check. Repeat mammogram due annually.     Adjuvant radiation started.     Osteopenia  Vit  D/calcium. Patient does not meet criteria for initiation of pharmacological treatment based off her FRAX score.     Depression  PCP to manage.         Patient Follow Up: 6 months  Patient was given instructions and counseling regarding her condition or for health maintenance advice. Please see specific information pulled into the AVS if appropriate.

## 2023-08-29 ENCOUNTER — HOSPITAL ENCOUNTER (OUTPATIENT)
Dept: RADIATION ONCOLOGY | Facility: HOSPITAL | Age: 68
Discharge: HOME | End: 2023-08-29
Payer: COMMERCIAL

## 2023-08-29 LAB
RAD ONC ARIA COURSE ID: NORMAL
RAD ONC ARIA COURSE INTENT: NORMAL
RAD ONC ARIA COURSE LAST TREATMENT DATE: NORMAL
RAD ONC ARIA COURSE START DATE: NORMAL
RAD ONC ARIA COURSE TREATMENT ELAPSED DAYS: 7
RAD ONC ARIA FIRST TREATMENT DATE: NORMAL
RAD ONC ARIA PLAN FRACTIONS TREATED TO DATE: 6
RAD ONC ARIA PLAN FRACTIONS TREATED TO DATE: 6
RAD ONC ARIA PLAN ID: NORMAL
RAD ONC ARIA PLAN ID: NORMAL
RAD ONC ARIA PLAN PRESCRIBED DOSE PER FRACTION: 2 GY
RAD ONC ARIA PLAN PRESCRIBED DOSE PER FRACTION: 2 GY
RAD ONC ARIA PLAN PRIMARY REFERENCE POINT: NORMAL
RAD ONC ARIA PLAN PRIMARY REFERENCE POINT: NORMAL
RAD ONC ARIA PLAN TOTAL FRACTIONS PRESCRIBED: 25
RAD ONC ARIA PLAN TOTAL FRACTIONS PRESCRIBED: 25
RAD ONC ARIA PLAN TOTAL PRESCRIBED DOSE: 5000 CGY
RAD ONC ARIA PLAN TOTAL PRESCRIBED DOSE: 5000 CGY
RAD ONC ARIA REFERENCE POINT DOSAGE GIVEN TO DATE: 12 GY
RAD ONC ARIA REFERENCE POINT DOSAGE GIVEN TO DATE: 12 GY
RAD ONC ARIA REFERENCE POINT ID: NORMAL
RAD ONC ARIA REFERENCE POINT ID: NORMAL
RAD ONC ARIA REFERENCE POINT SESSION DOSAGE GIVEN: 2 GY
RAD ONC ARIA REFERENCE POINT SESSION DOSAGE GIVEN: 2 GY

## 2023-08-29 PROCEDURE — 77412 RADIATION TX DELIVERY LVL 3: CPT | Performed by: RADIOLOGY

## 2023-08-29 PROCEDURE — 77387 GUIDANCE FOR RADJ TX DLVR: CPT | Performed by: RADIOLOGY

## 2023-08-30 ENCOUNTER — HOSPITAL ENCOUNTER (OUTPATIENT)
Dept: RADIATION ONCOLOGY | Facility: HOSPITAL | Age: 68
Discharge: HOME | End: 2023-08-30
Payer: COMMERCIAL

## 2023-08-30 ENCOUNTER — HOSPITAL ENCOUNTER (OUTPATIENT)
Dept: RADIATION ONCOLOGY | Facility: HOSPITAL | Age: 68
Discharge: HOME | End: 2023-08-30

## 2023-08-30 VITALS
WEIGHT: 130.95 LBS | TEMPERATURE: 97.8 F | HEART RATE: 47 BPM | DIASTOLIC BLOOD PRESSURE: 68 MMHG | BODY MASS INDEX: 24.35 KG/M2 | SYSTOLIC BLOOD PRESSURE: 156 MMHG | RESPIRATION RATE: 19 BRPM | OXYGEN SATURATION: 100 %

## 2023-08-30 DIAGNOSIS — C50.811 MALIGNANT NEOPLASM OF OVERLAPPING SITES OF RIGHT BREAST IN FEMALE, ESTROGEN RECEPTOR POSITIVE: Primary | ICD-10-CM

## 2023-08-30 DIAGNOSIS — Z17.0 MALIGNANT NEOPLASM OF OVERLAPPING SITES OF RIGHT BREAST IN FEMALE, ESTROGEN RECEPTOR POSITIVE: Primary | ICD-10-CM

## 2023-08-30 LAB
RAD ONC ARIA COURSE ID: NORMAL
RAD ONC ARIA COURSE INTENT: NORMAL
RAD ONC ARIA COURSE LAST TREATMENT DATE: NORMAL
RAD ONC ARIA COURSE START DATE: NORMAL
RAD ONC ARIA COURSE TREATMENT ELAPSED DAYS: 8
RAD ONC ARIA FIRST TREATMENT DATE: NORMAL
RAD ONC ARIA PLAN FRACTIONS TREATED TO DATE: 7
RAD ONC ARIA PLAN FRACTIONS TREATED TO DATE: 7
RAD ONC ARIA PLAN ID: NORMAL
RAD ONC ARIA PLAN ID: NORMAL
RAD ONC ARIA PLAN PRESCRIBED DOSE PER FRACTION: 2 GY
RAD ONC ARIA PLAN PRESCRIBED DOSE PER FRACTION: 2 GY
RAD ONC ARIA PLAN PRIMARY REFERENCE POINT: NORMAL
RAD ONC ARIA PLAN PRIMARY REFERENCE POINT: NORMAL
RAD ONC ARIA PLAN TOTAL FRACTIONS PRESCRIBED: 25
RAD ONC ARIA PLAN TOTAL FRACTIONS PRESCRIBED: 25
RAD ONC ARIA PLAN TOTAL PRESCRIBED DOSE: 5000 CGY
RAD ONC ARIA PLAN TOTAL PRESCRIBED DOSE: 5000 CGY
RAD ONC ARIA REFERENCE POINT DOSAGE GIVEN TO DATE: 14 GY
RAD ONC ARIA REFERENCE POINT DOSAGE GIVEN TO DATE: 14 GY
RAD ONC ARIA REFERENCE POINT ID: NORMAL
RAD ONC ARIA REFERENCE POINT ID: NORMAL
RAD ONC ARIA REFERENCE POINT SESSION DOSAGE GIVEN: 2 GY
RAD ONC ARIA REFERENCE POINT SESSION DOSAGE GIVEN: 2 GY

## 2023-08-30 PROCEDURE — 77412 RADIATION TX DELIVERY LVL 3: CPT | Performed by: RADIOLOGY

## 2023-08-30 PROCEDURE — 77387 GUIDANCE FOR RADJ TX DLVR: CPT | Performed by: RADIOLOGY

## 2023-08-30 NOTE — PROGRESS NOTES
On Treatment Visit       Patient: Arely Huerta   YOB: 1955   Medical Record Number: [id number]     Date of Visit  August 30, 2023   Primary Diagnosis:Malignant neoplasm of overlapping sites of right breast in female, estrogen receptor positive [C50.811, Z17.0]  Cancer Staging: Cancer Staging   cT1c, cN0, cM0, ER+, LA+, HER2-  pT1c, pN1a, cM0, Oncotype DX score: 19       was seen today for an on treatment visit.  She is receiving radiation therapy to the right breast and supraclavicular fossa.  She  has received 1400 cGy in 7 fractions out of a planned dose of 5000 cGy in 25 fractions. She is currently receiving concurrent anastrozole per Dr. Vazquez..     Today on exam the patient is tolerating radiation therapy well and has no new disease or treatment-related complaints.                                             Review of Systems:   Review of Systems   Constitutional:  Positive for fatigue (7/10). Negative for appetite change and unexpected weight change.   HENT:  Negative for hearing loss, sore throat, tinnitus and trouble swallowing.    Eyes:  Positive for visual disturbance (has very minimal vision in right eye).        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     Respiratory:  Negative for cough and shortness of breath.    Cardiovascular:  Negative for chest pain, palpitations and leg swelling.        Hx of mitral valve prolapse. Taking Atenolol for this.  Per pt, her cardiologist likes to keep her heart rate around 50.    Gastrointestinal:  Negative for anal bleeding, blood in stool, constipation, diarrhea, nausea and vomiting.   Endocrine: Positive for heat intolerance (Occasional, tolerable).   Genitourinary:  Positive for frequency (Ongoing). Negative for difficulty urinating, dysuria, hematuria and urgency.   Musculoskeletal:  Positive for arthralgias (Ongoing), back pain (ongoing- ankylosing spondylitis), neck pain (d/t arthritis- ongoing) and neck stiffness  (d/t arthritis- ongoing). Negative for joint swelling.   Skin:  Positive for color change (Mild erythema, noted since sx.). Negative for rash.   Neurological:  Positive for headaches (frequent mild headaches, taking excedrine for this with relief- ongoing since young adult). Negative for dizziness and weakness.   Hematological:  Bruises/bleeds easily (takes aspirin for headaches.).   Psychiatric/Behavioral:  Negative for sleep disturbance.      Vitals:     Vitals:    08/30/23 1132   BP:    Pulse: (!) 47   Resp:    Temp:    SpO2:        Weight:   Wt Readings from Last 3 Encounters:   08/30/23 59.4 kg (130 lb 15.3 oz)   08/28/23 59.8 kg (131 lb 13.4 oz)   08/22/23 59.9 kg (132 lb 0.9 oz)      Pain:    Pain Score    08/30/23 1109   PainSc: 0-No pain         Physical Exam:  Physical Exam  Constitutional:       Appearance: Normal appearance.   Cardiovascular:      Rate and Rhythm: Regular rhythm. Bradycardia present.   Pulmonary:      Effort: Pulmonary effort is normal.      Breath sounds: Normal breath sounds.   Chest:      Comments: Bilateral mammoplasty scars.  Focal erythema medial left breast (present since surgery, per patient.)  Neurological:      Mental Status: She is alert.         Plan: I have reviewed treatment setup notes, checked and approved the daily guidance images.  I reviewed dose delivery, treatment parameters and deemed them appropriate. We plan to continue radiation therapy as prescribed.  Patient is asymptomatic but will contact her PCP today regarding her cardiac issues.      Irish Mahan MD  Radiation Oncology   Electronically signed 8/30/2023  11:45 EDT

## 2023-08-31 ENCOUNTER — HOSPITAL ENCOUNTER (OUTPATIENT)
Dept: RADIATION ONCOLOGY | Facility: HOSPITAL | Age: 68
Discharge: HOME | End: 2023-08-31
Payer: COMMERCIAL

## 2023-08-31 LAB
RAD ONC ARIA COURSE ID: NORMAL
RAD ONC ARIA COURSE INTENT: NORMAL
RAD ONC ARIA COURSE LAST TREATMENT DATE: NORMAL
RAD ONC ARIA COURSE START DATE: NORMAL
RAD ONC ARIA COURSE TREATMENT ELAPSED DAYS: 9
RAD ONC ARIA FIRST TREATMENT DATE: NORMAL
RAD ONC ARIA PLAN FRACTIONS TREATED TO DATE: 8
RAD ONC ARIA PLAN FRACTIONS TREATED TO DATE: 8
RAD ONC ARIA PLAN ID: NORMAL
RAD ONC ARIA PLAN ID: NORMAL
RAD ONC ARIA PLAN PRESCRIBED DOSE PER FRACTION: 2 GY
RAD ONC ARIA PLAN PRESCRIBED DOSE PER FRACTION: 2 GY
RAD ONC ARIA PLAN PRIMARY REFERENCE POINT: NORMAL
RAD ONC ARIA PLAN PRIMARY REFERENCE POINT: NORMAL
RAD ONC ARIA PLAN TOTAL FRACTIONS PRESCRIBED: 25
RAD ONC ARIA PLAN TOTAL FRACTIONS PRESCRIBED: 25
RAD ONC ARIA PLAN TOTAL PRESCRIBED DOSE: 5000 CGY
RAD ONC ARIA PLAN TOTAL PRESCRIBED DOSE: 5000 CGY
RAD ONC ARIA REFERENCE POINT DOSAGE GIVEN TO DATE: 16 GY
RAD ONC ARIA REFERENCE POINT DOSAGE GIVEN TO DATE: 16 GY
RAD ONC ARIA REFERENCE POINT ID: NORMAL
RAD ONC ARIA REFERENCE POINT ID: NORMAL
RAD ONC ARIA REFERENCE POINT SESSION DOSAGE GIVEN: 2 GY
RAD ONC ARIA REFERENCE POINT SESSION DOSAGE GIVEN: 2 GY

## 2023-08-31 PROCEDURE — 77412 RADIATION TX DELIVERY LVL 3: CPT | Performed by: RADIOLOGY

## 2023-08-31 PROCEDURE — 77387 GUIDANCE FOR RADJ TX DLVR: CPT | Performed by: RADIOLOGY

## 2023-09-01 ENCOUNTER — HOSPITAL ENCOUNTER (OUTPATIENT)
Dept: RADIATION ONCOLOGY | Facility: HOSPITAL | Age: 68
Discharge: HOME | End: 2023-09-01
Payer: COMMERCIAL

## 2023-09-01 ENCOUNTER — HOSPITAL ENCOUNTER (OUTPATIENT)
Dept: RADIATION ONCOLOGY | Facility: HOSPITAL | Age: 68
Setting detail: RADIATION/ONCOLOGY SERIES
End: 2023-09-01
Payer: COMMERCIAL

## 2023-09-01 LAB
RAD ONC ARIA COURSE ID: NORMAL
RAD ONC ARIA COURSE INTENT: NORMAL
RAD ONC ARIA COURSE LAST TREATMENT DATE: NORMAL
RAD ONC ARIA COURSE START DATE: NORMAL
RAD ONC ARIA COURSE TREATMENT ELAPSED DAYS: 10
RAD ONC ARIA FIRST TREATMENT DATE: NORMAL
RAD ONC ARIA PLAN FRACTIONS TREATED TO DATE: 9
RAD ONC ARIA PLAN FRACTIONS TREATED TO DATE: 9
RAD ONC ARIA PLAN ID: NORMAL
RAD ONC ARIA PLAN ID: NORMAL
RAD ONC ARIA PLAN PRESCRIBED DOSE PER FRACTION: 2 GY
RAD ONC ARIA PLAN PRESCRIBED DOSE PER FRACTION: 2 GY
RAD ONC ARIA PLAN PRIMARY REFERENCE POINT: NORMAL
RAD ONC ARIA PLAN PRIMARY REFERENCE POINT: NORMAL
RAD ONC ARIA PLAN TOTAL FRACTIONS PRESCRIBED: 25
RAD ONC ARIA PLAN TOTAL FRACTIONS PRESCRIBED: 25
RAD ONC ARIA PLAN TOTAL PRESCRIBED DOSE: 5000 CGY
RAD ONC ARIA PLAN TOTAL PRESCRIBED DOSE: 5000 CGY
RAD ONC ARIA REFERENCE POINT DOSAGE GIVEN TO DATE: 18 GY
RAD ONC ARIA REFERENCE POINT DOSAGE GIVEN TO DATE: 18 GY
RAD ONC ARIA REFERENCE POINT ID: NORMAL
RAD ONC ARIA REFERENCE POINT ID: NORMAL
RAD ONC ARIA REFERENCE POINT SESSION DOSAGE GIVEN: 2 GY
RAD ONC ARIA REFERENCE POINT SESSION DOSAGE GIVEN: 2 GY

## 2023-09-01 PROCEDURE — 77412 RADIATION TX DELIVERY LVL 3: CPT | Performed by: RADIOLOGY

## 2023-09-01 PROCEDURE — 77387 GUIDANCE FOR RADJ TX DLVR: CPT | Performed by: RADIOLOGY

## 2023-09-05 ENCOUNTER — HOSPITAL ENCOUNTER (OUTPATIENT)
Dept: RADIATION ONCOLOGY | Facility: HOSPITAL | Age: 68
Discharge: HOME | End: 2023-09-05
Payer: COMMERCIAL

## 2023-09-05 ENCOUNTER — HOSPITAL ENCOUNTER (OUTPATIENT)
Dept: RADIATION ONCOLOGY | Facility: HOSPITAL | Age: 68
Discharge: HOME | End: 2023-09-05

## 2023-09-05 VITALS
DIASTOLIC BLOOD PRESSURE: 61 MMHG | SYSTOLIC BLOOD PRESSURE: 144 MMHG | HEART RATE: 46 BPM | WEIGHT: 131.61 LBS | OXYGEN SATURATION: 100 % | BODY MASS INDEX: 24.47 KG/M2 | TEMPERATURE: 98.1 F

## 2023-09-05 DIAGNOSIS — C50.811 MALIGNANT NEOPLASM OF OVERLAPPING SITES OF RIGHT BREAST IN FEMALE, ESTROGEN RECEPTOR POSITIVE: Primary | ICD-10-CM

## 2023-09-05 DIAGNOSIS — Z17.0 MALIGNANT NEOPLASM OF OVERLAPPING SITES OF RIGHT BREAST IN FEMALE, ESTROGEN RECEPTOR POSITIVE: Primary | ICD-10-CM

## 2023-09-05 LAB
RAD ONC ARIA COURSE ID: NORMAL
RAD ONC ARIA COURSE INTENT: NORMAL
RAD ONC ARIA COURSE LAST TREATMENT DATE: NORMAL
RAD ONC ARIA COURSE START DATE: NORMAL
RAD ONC ARIA COURSE TREATMENT ELAPSED DAYS: 14
RAD ONC ARIA FIRST TREATMENT DATE: NORMAL
RAD ONC ARIA PLAN FRACTIONS TREATED TO DATE: 10
RAD ONC ARIA PLAN FRACTIONS TREATED TO DATE: 10
RAD ONC ARIA PLAN ID: NORMAL
RAD ONC ARIA PLAN ID: NORMAL
RAD ONC ARIA PLAN PRESCRIBED DOSE PER FRACTION: 2 GY
RAD ONC ARIA PLAN PRESCRIBED DOSE PER FRACTION: 2 GY
RAD ONC ARIA PLAN PRIMARY REFERENCE POINT: NORMAL
RAD ONC ARIA PLAN PRIMARY REFERENCE POINT: NORMAL
RAD ONC ARIA PLAN TOTAL FRACTIONS PRESCRIBED: 25
RAD ONC ARIA PLAN TOTAL FRACTIONS PRESCRIBED: 25
RAD ONC ARIA PLAN TOTAL PRESCRIBED DOSE: 5000 CGY
RAD ONC ARIA PLAN TOTAL PRESCRIBED DOSE: 5000 CGY
RAD ONC ARIA REFERENCE POINT DOSAGE GIVEN TO DATE: 20 GY
RAD ONC ARIA REFERENCE POINT DOSAGE GIVEN TO DATE: 20 GY
RAD ONC ARIA REFERENCE POINT ID: NORMAL
RAD ONC ARIA REFERENCE POINT ID: NORMAL
RAD ONC ARIA REFERENCE POINT SESSION DOSAGE GIVEN: 2 GY
RAD ONC ARIA REFERENCE POINT SESSION DOSAGE GIVEN: 2 GY

## 2023-09-05 PROCEDURE — 77412 RADIATION TX DELIVERY LVL 3: CPT | Performed by: RADIOLOGY

## 2023-09-05 PROCEDURE — 77336 RADIATION PHYSICS CONSULT: CPT | Performed by: RADIOLOGY

## 2023-09-05 PROCEDURE — 77387 GUIDANCE FOR RADJ TX DLVR: CPT | Performed by: RADIOLOGY

## 2023-09-05 NOTE — PROGRESS NOTES
On Treatment Visit       Patient: Arely Huerta   YOB: 1955   Medical Record Number: [id number]     Date of Visit  September 5, 2023   Primary Diagnosis:Malignant neoplasm of overlapping sites of right breast in female, estrogen receptor positive [C50.811, Z17.0]  Cancer Staging: Cancer Staging   Malignant neoplasm of overlapping sites of right breast in female, estrogen receptor positive  Staging form: Breast, AJCC 8th Edition  - Clinical: cT1c, cN0, cM0, ER+, ID+, HER2- - Signed by David Vazquez MD on 5/18/2023  - Pathologic: pT1c, pN1a, cM0, Oncotype DX score: 19 - Signed by David Vazquez MD on 7/20/2023         was seen today for an on treatment visit.  She is receiving radiation therapy to the right breast and axilla. She  has received 2000 cGy in 10 fractions out of a planned dose of 5000 cGy in 25 fractions.    Today on exam the patient is tolerating radiation therapy well and has no new disease or treatment-related complaints. Does notice some slight redness of right breast                                            Review of Systems:   Review of Systems   Constitutional:  Positive for fatigue (7/10). Negative for appetite change and unexpected weight change.   HENT:  Negative for hearing loss, sore throat, tinnitus and trouble swallowing.    Eyes:  Positive for visual disturbance (has very minimal vision in right eye).        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     Respiratory:  Negative for cough and shortness of breath.    Cardiovascular:  Negative for chest pain, palpitations and leg swelling.        Hx of mitral valve prolapse. Taking Atenolol for this.  Per pt, her cardiologist likes to keep her heart rate around 50.    Gastrointestinal:  Negative for anal bleeding, blood in stool, constipation, diarrhea, nausea and vomiting.   Endocrine: Positive for heat intolerance (occasionally since surgery).   Genitourinary:  Negative for  difficulty urinating, dysuria, frequency, hematuria and urgency.   Musculoskeletal:  Positive for arthralgias (Ongoing), back pain (ongoing- ankylosing spondylitis), neck pain (d/t arthritis- ongoing) and neck stiffness (d/t arthritis- ongoing). Negative for joint swelling.   Skin:  Negative for color change and rash.   Allergic/Immunologic: Negative.    Neurological:  Positive for headaches (frequent mild headaches, taking excedrine for this with relief- ongoing since young adult). Negative for dizziness and weakness.   Hematological:  Bruises/bleeds easily (takes aspirin for headaches.).   Psychiatric/Behavioral:  Negative for sleep disturbance.      Vitals:     Vitals:    09/05/23 1129   BP: 144/61   Pulse: (!) 46   Temp: 98.1 °F (36.7 °C)   SpO2: 100%       Weight:   Wt Readings from Last 3 Encounters:   09/05/23 59.7 kg (131 lb 9.8 oz)   08/30/23 59.4 kg (130 lb 15.3 oz)   08/28/23 59.8 kg (131 lb 13.4 oz)      Pain:    Pain Score    09/05/23 1129   PainSc: 0-No pain         Physical Exam:  Gen: WD/WN; NAD  HEENT: MMM  Trachea: midline  Chest: symmetric; faint right breast erythema  Resp: normal respiratory effort  Extr: warm, well-perfused  Neuro: awake and alert; no aphasia or neglect  Psyche: Tearful and anxious    Plan: I have reviewed treatment setup notes, checked and approved the daily guidance images.  I reviewed dose delivery, treatment parameters and deemed them appropriate. We plan to continue radiation therapy as prescribed.    Hypertensive presumably due to anxiety; will continue to monitor      Radiation Oncology    Electronically signed by Orion Mckinney MD 9/5/2023  11:45 EDT

## 2023-09-06 ENCOUNTER — HOSPITAL ENCOUNTER (OUTPATIENT)
Dept: RADIATION ONCOLOGY | Facility: HOSPITAL | Age: 68
Discharge: HOME | End: 2023-09-06
Payer: COMMERCIAL

## 2023-09-06 LAB
RAD ONC ARIA COURSE ID: NORMAL
RAD ONC ARIA COURSE INTENT: NORMAL
RAD ONC ARIA COURSE LAST TREATMENT DATE: NORMAL
RAD ONC ARIA COURSE START DATE: NORMAL
RAD ONC ARIA COURSE TREATMENT ELAPSED DAYS: 15
RAD ONC ARIA FIRST TREATMENT DATE: NORMAL
RAD ONC ARIA PLAN FRACTIONS TREATED TO DATE: 11
RAD ONC ARIA PLAN FRACTIONS TREATED TO DATE: 11
RAD ONC ARIA PLAN ID: NORMAL
RAD ONC ARIA PLAN ID: NORMAL
RAD ONC ARIA PLAN PRESCRIBED DOSE PER FRACTION: 2 GY
RAD ONC ARIA PLAN PRESCRIBED DOSE PER FRACTION: 2 GY
RAD ONC ARIA PLAN PRIMARY REFERENCE POINT: NORMAL
RAD ONC ARIA PLAN PRIMARY REFERENCE POINT: NORMAL
RAD ONC ARIA PLAN TOTAL FRACTIONS PRESCRIBED: 25
RAD ONC ARIA PLAN TOTAL FRACTIONS PRESCRIBED: 25
RAD ONC ARIA PLAN TOTAL PRESCRIBED DOSE: 5000 CGY
RAD ONC ARIA PLAN TOTAL PRESCRIBED DOSE: 5000 CGY
RAD ONC ARIA REFERENCE POINT DOSAGE GIVEN TO DATE: 22 GY
RAD ONC ARIA REFERENCE POINT DOSAGE GIVEN TO DATE: 22 GY
RAD ONC ARIA REFERENCE POINT ID: NORMAL
RAD ONC ARIA REFERENCE POINT ID: NORMAL
RAD ONC ARIA REFERENCE POINT SESSION DOSAGE GIVEN: 2 GY
RAD ONC ARIA REFERENCE POINT SESSION DOSAGE GIVEN: 2 GY

## 2023-09-06 PROCEDURE — 77387 GUIDANCE FOR RADJ TX DLVR: CPT | Performed by: RADIOLOGY

## 2023-09-06 PROCEDURE — 77412 RADIATION TX DELIVERY LVL 3: CPT | Performed by: RADIOLOGY

## 2023-09-07 ENCOUNTER — HOSPITAL ENCOUNTER (OUTPATIENT)
Dept: RADIATION ONCOLOGY | Facility: HOSPITAL | Age: 68
Discharge: HOME | End: 2023-09-07
Payer: COMMERCIAL

## 2023-09-07 LAB
RAD ONC ARIA COURSE ID: NORMAL
RAD ONC ARIA COURSE INTENT: NORMAL
RAD ONC ARIA COURSE LAST TREATMENT DATE: NORMAL
RAD ONC ARIA COURSE START DATE: NORMAL
RAD ONC ARIA COURSE TREATMENT ELAPSED DAYS: 16
RAD ONC ARIA FIRST TREATMENT DATE: NORMAL
RAD ONC ARIA PLAN FRACTIONS TREATED TO DATE: 12
RAD ONC ARIA PLAN FRACTIONS TREATED TO DATE: 12
RAD ONC ARIA PLAN ID: NORMAL
RAD ONC ARIA PLAN ID: NORMAL
RAD ONC ARIA PLAN PRESCRIBED DOSE PER FRACTION: 2 GY
RAD ONC ARIA PLAN PRESCRIBED DOSE PER FRACTION: 2 GY
RAD ONC ARIA PLAN PRIMARY REFERENCE POINT: NORMAL
RAD ONC ARIA PLAN PRIMARY REFERENCE POINT: NORMAL
RAD ONC ARIA PLAN TOTAL FRACTIONS PRESCRIBED: 25
RAD ONC ARIA PLAN TOTAL FRACTIONS PRESCRIBED: 25
RAD ONC ARIA PLAN TOTAL PRESCRIBED DOSE: 5000 CGY
RAD ONC ARIA PLAN TOTAL PRESCRIBED DOSE: 5000 CGY
RAD ONC ARIA REFERENCE POINT DOSAGE GIVEN TO DATE: 24 GY
RAD ONC ARIA REFERENCE POINT DOSAGE GIVEN TO DATE: 24 GY
RAD ONC ARIA REFERENCE POINT ID: NORMAL
RAD ONC ARIA REFERENCE POINT ID: NORMAL
RAD ONC ARIA REFERENCE POINT SESSION DOSAGE GIVEN: 2 GY
RAD ONC ARIA REFERENCE POINT SESSION DOSAGE GIVEN: 2 GY

## 2023-09-07 PROCEDURE — 77387 GUIDANCE FOR RADJ TX DLVR: CPT | Performed by: RADIOLOGY

## 2023-09-07 PROCEDURE — G6002 STEREOSCOPIC X-RAY GUIDANCE: HCPCS | Performed by: RADIOLOGY

## 2023-09-07 PROCEDURE — 77412 RADIATION TX DELIVERY LVL 3: CPT | Performed by: RADIOLOGY

## 2023-09-08 ENCOUNTER — HOSPITAL ENCOUNTER (OUTPATIENT)
Dept: RADIATION ONCOLOGY | Facility: HOSPITAL | Age: 68
Discharge: HOME | End: 2023-09-08
Payer: COMMERCIAL

## 2023-09-08 LAB
RAD ONC ARIA COURSE ID: NORMAL
RAD ONC ARIA COURSE INTENT: NORMAL
RAD ONC ARIA COURSE LAST TREATMENT DATE: NORMAL
RAD ONC ARIA COURSE START DATE: NORMAL
RAD ONC ARIA COURSE TREATMENT ELAPSED DAYS: 17
RAD ONC ARIA FIRST TREATMENT DATE: NORMAL
RAD ONC ARIA PLAN FRACTIONS TREATED TO DATE: 13
RAD ONC ARIA PLAN FRACTIONS TREATED TO DATE: 13
RAD ONC ARIA PLAN ID: NORMAL
RAD ONC ARIA PLAN ID: NORMAL
RAD ONC ARIA PLAN PRESCRIBED DOSE PER FRACTION: 2 GY
RAD ONC ARIA PLAN PRESCRIBED DOSE PER FRACTION: 2 GY
RAD ONC ARIA PLAN PRIMARY REFERENCE POINT: NORMAL
RAD ONC ARIA PLAN PRIMARY REFERENCE POINT: NORMAL
RAD ONC ARIA PLAN TOTAL FRACTIONS PRESCRIBED: 25
RAD ONC ARIA PLAN TOTAL FRACTIONS PRESCRIBED: 25
RAD ONC ARIA PLAN TOTAL PRESCRIBED DOSE: 5000 CGY
RAD ONC ARIA PLAN TOTAL PRESCRIBED DOSE: 5000 CGY
RAD ONC ARIA REFERENCE POINT DOSAGE GIVEN TO DATE: 26 GY
RAD ONC ARIA REFERENCE POINT DOSAGE GIVEN TO DATE: 26 GY
RAD ONC ARIA REFERENCE POINT ID: NORMAL
RAD ONC ARIA REFERENCE POINT ID: NORMAL
RAD ONC ARIA REFERENCE POINT SESSION DOSAGE GIVEN: 2 GY
RAD ONC ARIA REFERENCE POINT SESSION DOSAGE GIVEN: 2 GY

## 2023-09-08 PROCEDURE — G6002 STEREOSCOPIC X-RAY GUIDANCE: HCPCS | Performed by: RADIOLOGY

## 2023-09-08 PROCEDURE — 77387 GUIDANCE FOR RADJ TX DLVR: CPT | Performed by: RADIOLOGY

## 2023-09-08 PROCEDURE — 77412 RADIATION TX DELIVERY LVL 3: CPT | Performed by: RADIOLOGY

## 2023-09-11 ENCOUNTER — HOSPITAL ENCOUNTER (OUTPATIENT)
Dept: RADIATION ONCOLOGY | Facility: HOSPITAL | Age: 68
Discharge: HOME | End: 2023-09-11
Payer: COMMERCIAL

## 2023-09-11 LAB
RAD ONC ARIA COURSE ID: NORMAL
RAD ONC ARIA COURSE INTENT: NORMAL
RAD ONC ARIA COURSE LAST TREATMENT DATE: NORMAL
RAD ONC ARIA COURSE START DATE: NORMAL
RAD ONC ARIA COURSE TREATMENT ELAPSED DAYS: 20
RAD ONC ARIA FIRST TREATMENT DATE: NORMAL
RAD ONC ARIA PLAN FRACTIONS TREATED TO DATE: 14
RAD ONC ARIA PLAN FRACTIONS TREATED TO DATE: 14
RAD ONC ARIA PLAN ID: NORMAL
RAD ONC ARIA PLAN ID: NORMAL
RAD ONC ARIA PLAN PRESCRIBED DOSE PER FRACTION: 2 GY
RAD ONC ARIA PLAN PRESCRIBED DOSE PER FRACTION: 2 GY
RAD ONC ARIA PLAN PRIMARY REFERENCE POINT: NORMAL
RAD ONC ARIA PLAN PRIMARY REFERENCE POINT: NORMAL
RAD ONC ARIA PLAN TOTAL FRACTIONS PRESCRIBED: 25
RAD ONC ARIA PLAN TOTAL FRACTIONS PRESCRIBED: 25
RAD ONC ARIA PLAN TOTAL PRESCRIBED DOSE: 5000 CGY
RAD ONC ARIA PLAN TOTAL PRESCRIBED DOSE: 5000 CGY
RAD ONC ARIA REFERENCE POINT DOSAGE GIVEN TO DATE: 28 GY
RAD ONC ARIA REFERENCE POINT DOSAGE GIVEN TO DATE: 28 GY
RAD ONC ARIA REFERENCE POINT ID: NORMAL
RAD ONC ARIA REFERENCE POINT ID: NORMAL
RAD ONC ARIA REFERENCE POINT SESSION DOSAGE GIVEN: 2 GY
RAD ONC ARIA REFERENCE POINT SESSION DOSAGE GIVEN: 2 GY

## 2023-09-11 PROCEDURE — 77412 RADIATION TX DELIVERY LVL 3: CPT | Performed by: RADIOLOGY

## 2023-09-11 PROCEDURE — G6002 STEREOSCOPIC X-RAY GUIDANCE: HCPCS | Performed by: RADIOLOGY

## 2023-09-11 PROCEDURE — 77387 GUIDANCE FOR RADJ TX DLVR: CPT | Performed by: RADIOLOGY

## 2023-09-12 ENCOUNTER — HOSPITAL ENCOUNTER (OUTPATIENT)
Dept: RADIATION ONCOLOGY | Facility: HOSPITAL | Age: 68
Discharge: HOME | End: 2023-09-12

## 2023-09-12 ENCOUNTER — HOSPITAL ENCOUNTER (OUTPATIENT)
Dept: RADIATION ONCOLOGY | Facility: HOSPITAL | Age: 68
Discharge: HOME | End: 2023-09-12
Payer: COMMERCIAL

## 2023-09-12 VITALS
TEMPERATURE: 98.1 F | DIASTOLIC BLOOD PRESSURE: 80 MMHG | WEIGHT: 132.28 LBS | BODY MASS INDEX: 24.59 KG/M2 | HEART RATE: 46 BPM | OXYGEN SATURATION: 100 % | SYSTOLIC BLOOD PRESSURE: 126 MMHG

## 2023-09-12 DIAGNOSIS — C50.811 MALIGNANT NEOPLASM OF OVERLAPPING SITES OF RIGHT BREAST IN FEMALE, ESTROGEN RECEPTOR POSITIVE: Primary | ICD-10-CM

## 2023-09-12 DIAGNOSIS — Z17.0 MALIGNANT NEOPLASM OF OVERLAPPING SITES OF RIGHT BREAST IN FEMALE, ESTROGEN RECEPTOR POSITIVE: Primary | ICD-10-CM

## 2023-09-12 LAB
RAD ONC ARIA COURSE ID: NORMAL
RAD ONC ARIA COURSE INTENT: NORMAL
RAD ONC ARIA COURSE LAST TREATMENT DATE: NORMAL
RAD ONC ARIA COURSE START DATE: NORMAL
RAD ONC ARIA COURSE TREATMENT ELAPSED DAYS: 21
RAD ONC ARIA FIRST TREATMENT DATE: NORMAL
RAD ONC ARIA PLAN FRACTIONS TREATED TO DATE: 15
RAD ONC ARIA PLAN FRACTIONS TREATED TO DATE: 15
RAD ONC ARIA PLAN ID: NORMAL
RAD ONC ARIA PLAN ID: NORMAL
RAD ONC ARIA PLAN PRESCRIBED DOSE PER FRACTION: 2 GY
RAD ONC ARIA PLAN PRESCRIBED DOSE PER FRACTION: 2 GY
RAD ONC ARIA PLAN PRIMARY REFERENCE POINT: NORMAL
RAD ONC ARIA PLAN PRIMARY REFERENCE POINT: NORMAL
RAD ONC ARIA PLAN TOTAL FRACTIONS PRESCRIBED: 25
RAD ONC ARIA PLAN TOTAL FRACTIONS PRESCRIBED: 25
RAD ONC ARIA PLAN TOTAL PRESCRIBED DOSE: 5000 CGY
RAD ONC ARIA PLAN TOTAL PRESCRIBED DOSE: 5000 CGY
RAD ONC ARIA REFERENCE POINT DOSAGE GIVEN TO DATE: 30 GY
RAD ONC ARIA REFERENCE POINT DOSAGE GIVEN TO DATE: 30 GY
RAD ONC ARIA REFERENCE POINT ID: NORMAL
RAD ONC ARIA REFERENCE POINT ID: NORMAL
RAD ONC ARIA REFERENCE POINT SESSION DOSAGE GIVEN: 2 GY
RAD ONC ARIA REFERENCE POINT SESSION DOSAGE GIVEN: 2 GY

## 2023-09-12 PROCEDURE — 77336 RADIATION PHYSICS CONSULT: CPT | Performed by: RADIOLOGY

## 2023-09-12 PROCEDURE — G6002 STEREOSCOPIC X-RAY GUIDANCE: HCPCS | Performed by: RADIOLOGY

## 2023-09-12 PROCEDURE — 77412 RADIATION TX DELIVERY LVL 3: CPT | Performed by: RADIOLOGY

## 2023-09-12 PROCEDURE — 77387 GUIDANCE FOR RADJ TX DLVR: CPT | Performed by: RADIOLOGY

## 2023-09-12 NOTE — PROGRESS NOTES
On Treatment Visit       Patient: Arely Huerta   YOB: 1955   Medical Record Number: [id number]     Date of Visit  September 12, 2023   Primary Diagnosis:Malignant neoplasm of overlapping sites of right breast in female, estrogen receptor positive [C50.811, Z17.0]  Cancer Staging: Cancer Staging   Malignant neoplasm of overlapping sites of right breast in female, estrogen receptor positive  Staging form: Breast, AJCC 8th Edition  - Clinical: cT1c, cN0, cM0, ER+, RI+, HER2- - Signed by David Vazquez MD on 5/18/2023  - Pathologic: pT1c, pN1a, cM0, Oncotype DX score: 19 - Signed by David Vazquez MD on 7/20/2023         was seen today for an on treatment visit.  She is receiving radiation therapy to the right breast and axilla. She  has received 3000 cGy in 15 fractions out of a planned dose of 5000 cGy in 25 fractions.    No complaints today other than some right lower quadrant fullness; she feels as though she may have a hernia                                            Review of Systems:   Review of Systems   Constitutional:  Positive for fatigue (7/10). Negative for appetite change and unexpected weight change.   HENT:  Negative for hearing loss, sore throat, tinnitus and trouble swallowing.    Eyes:  Positive for visual disturbance (has very minimal vision in right eye).        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     Respiratory:  Negative for cough and shortness of breath.    Cardiovascular:  Negative for chest pain, palpitations and leg swelling.        Hx of mitral valve prolapse. Taking Atenolol for this.  Per pt, her cardiologist likes to keep her heart rate around 50.    Gastrointestinal:  Positive for abdominal distention (BULDGE ON RIGHT SIDE, CONCERN FOR HERMIA. PLANS TO SEE PCP). Negative for anal bleeding, blood in stool, constipation, diarrhea, nausea and vomiting.   Endocrine: Positive for heat intolerance (occasionally since surgery,  improving).   Genitourinary:  Negative for difficulty urinating, dysuria, frequency, hematuria and urgency.   Musculoskeletal:  Positive for arthralgias (Ongoing), back pain (ongoing- ankylosing spondylitis), neck pain (d/t arthritis- ongoing, tender retated to tx) and neck stiffness (d/t arthritis- ongoing). Negative for joint swelling.   Skin:  Positive for color change (redness at tx site). Negative for rash.   Allergic/Immunologic: Negative.    Neurological:  Positive for headaches (frequent mild headaches, taking excedrine for this with relief- ongoing since young adult). Negative for dizziness and weakness.   Hematological:  Bruises/bleeds easily (takes aspirin for headaches.).   Psychiatric/Behavioral:  Negative for sleep disturbance.      Vitals:     Vitals:    09/12/23 1154   BP: 126/80   Pulse: (!) 46   Temp: 98.1 °F (36.7 °C)   SpO2: 100%       Weight:   Wt Readings from Last 3 Encounters:   09/12/23 60 kg (132 lb 4.4 oz)   09/05/23 59.7 kg (131 lb 9.8 oz)   08/30/23 59.4 kg (130 lb 15.3 oz)      Pain:    Pain Score    09/12/23 1154   PainSc: 0-No pain         Physical Exam:  Gen: WD/WN; NAD  HEENT: MMM  Trachea: midline  Chest: symmetric  Abdomen: No palpable mass; some asymmetry between left and right lower quadrants  Resp: normal respiratory effort  Extr: warm, well-perfused  Neuro: awake and alert; no aphasia or neglect    Plan: I have reviewed treatment setup notes, checked and approved the daily guidance images.  I reviewed dose delivery, treatment parameters and deemed them appropriate. We plan to continue radiation therapy as prescribed.    Plans undergo evaluation by primary care physician in coming weeks    Radiation Oncology    Electronically signed by Orion Mckinney MD 9/12/2023  13:49 EDT

## 2023-09-13 ENCOUNTER — HOSPITAL ENCOUNTER (OUTPATIENT)
Dept: RADIATION ONCOLOGY | Facility: HOSPITAL | Age: 68
Discharge: HOME | End: 2023-09-13
Payer: COMMERCIAL

## 2023-09-13 LAB
RAD ONC ARIA COURSE ID: NORMAL
RAD ONC ARIA COURSE INTENT: NORMAL
RAD ONC ARIA COURSE LAST TREATMENT DATE: NORMAL
RAD ONC ARIA COURSE START DATE: NORMAL
RAD ONC ARIA COURSE TREATMENT ELAPSED DAYS: 22
RAD ONC ARIA FIRST TREATMENT DATE: NORMAL
RAD ONC ARIA PLAN FRACTIONS TREATED TO DATE: 16
RAD ONC ARIA PLAN FRACTIONS TREATED TO DATE: 16
RAD ONC ARIA PLAN ID: NORMAL
RAD ONC ARIA PLAN ID: NORMAL
RAD ONC ARIA PLAN PRESCRIBED DOSE PER FRACTION: 2 GY
RAD ONC ARIA PLAN PRESCRIBED DOSE PER FRACTION: 2 GY
RAD ONC ARIA PLAN PRIMARY REFERENCE POINT: NORMAL
RAD ONC ARIA PLAN PRIMARY REFERENCE POINT: NORMAL
RAD ONC ARIA PLAN TOTAL FRACTIONS PRESCRIBED: 25
RAD ONC ARIA PLAN TOTAL FRACTIONS PRESCRIBED: 25
RAD ONC ARIA PLAN TOTAL PRESCRIBED DOSE: 5000 CGY
RAD ONC ARIA PLAN TOTAL PRESCRIBED DOSE: 5000 CGY
RAD ONC ARIA REFERENCE POINT DOSAGE GIVEN TO DATE: 32 GY
RAD ONC ARIA REFERENCE POINT DOSAGE GIVEN TO DATE: 32 GY
RAD ONC ARIA REFERENCE POINT ID: NORMAL
RAD ONC ARIA REFERENCE POINT ID: NORMAL
RAD ONC ARIA REFERENCE POINT SESSION DOSAGE GIVEN: 2 GY
RAD ONC ARIA REFERENCE POINT SESSION DOSAGE GIVEN: 2 GY

## 2023-09-13 PROCEDURE — 77412 RADIATION TX DELIVERY LVL 3: CPT | Performed by: RADIOLOGY

## 2023-09-13 PROCEDURE — G6002 STEREOSCOPIC X-RAY GUIDANCE: HCPCS | Performed by: RADIOLOGY

## 2023-09-13 PROCEDURE — 77387 GUIDANCE FOR RADJ TX DLVR: CPT | Performed by: RADIOLOGY

## 2023-09-14 ENCOUNTER — HOSPITAL ENCOUNTER (OUTPATIENT)
Dept: RADIATION ONCOLOGY | Facility: HOSPITAL | Age: 68
Discharge: HOME | End: 2023-09-14
Payer: COMMERCIAL

## 2023-09-14 LAB
RAD ONC ARIA COURSE ID: NORMAL
RAD ONC ARIA COURSE INTENT: NORMAL
RAD ONC ARIA COURSE LAST TREATMENT DATE: NORMAL
RAD ONC ARIA COURSE START DATE: NORMAL
RAD ONC ARIA COURSE TREATMENT ELAPSED DAYS: 23
RAD ONC ARIA FIRST TREATMENT DATE: NORMAL
RAD ONC ARIA PLAN FRACTIONS TREATED TO DATE: 17
RAD ONC ARIA PLAN FRACTIONS TREATED TO DATE: 17
RAD ONC ARIA PLAN ID: NORMAL
RAD ONC ARIA PLAN ID: NORMAL
RAD ONC ARIA PLAN PRESCRIBED DOSE PER FRACTION: 2 GY
RAD ONC ARIA PLAN PRESCRIBED DOSE PER FRACTION: 2 GY
RAD ONC ARIA PLAN PRIMARY REFERENCE POINT: NORMAL
RAD ONC ARIA PLAN PRIMARY REFERENCE POINT: NORMAL
RAD ONC ARIA PLAN TOTAL FRACTIONS PRESCRIBED: 25
RAD ONC ARIA PLAN TOTAL FRACTIONS PRESCRIBED: 25
RAD ONC ARIA PLAN TOTAL PRESCRIBED DOSE: 5000 CGY
RAD ONC ARIA PLAN TOTAL PRESCRIBED DOSE: 5000 CGY
RAD ONC ARIA REFERENCE POINT DOSAGE GIVEN TO DATE: 34 GY
RAD ONC ARIA REFERENCE POINT DOSAGE GIVEN TO DATE: 34 GY
RAD ONC ARIA REFERENCE POINT ID: NORMAL
RAD ONC ARIA REFERENCE POINT ID: NORMAL
RAD ONC ARIA REFERENCE POINT SESSION DOSAGE GIVEN: 2 GY
RAD ONC ARIA REFERENCE POINT SESSION DOSAGE GIVEN: 2 GY

## 2023-09-14 PROCEDURE — 77412 RADIATION TX DELIVERY LVL 3: CPT | Performed by: RADIOLOGY

## 2023-09-14 PROCEDURE — G6002 STEREOSCOPIC X-RAY GUIDANCE: HCPCS | Performed by: RADIOLOGY

## 2023-09-14 PROCEDURE — 77387 GUIDANCE FOR RADJ TX DLVR: CPT | Performed by: RADIOLOGY

## 2023-09-15 ENCOUNTER — HOSPITAL ENCOUNTER (OUTPATIENT)
Dept: RADIATION ONCOLOGY | Facility: HOSPITAL | Age: 68
Discharge: HOME | End: 2023-09-15
Payer: COMMERCIAL

## 2023-09-15 LAB
RAD ONC ARIA COURSE ID: NORMAL
RAD ONC ARIA COURSE INTENT: NORMAL
RAD ONC ARIA COURSE LAST TREATMENT DATE: NORMAL
RAD ONC ARIA COURSE START DATE: NORMAL
RAD ONC ARIA COURSE TREATMENT ELAPSED DAYS: 24
RAD ONC ARIA FIRST TREATMENT DATE: NORMAL
RAD ONC ARIA PLAN FRACTIONS TREATED TO DATE: 18
RAD ONC ARIA PLAN FRACTIONS TREATED TO DATE: 18
RAD ONC ARIA PLAN ID: NORMAL
RAD ONC ARIA PLAN ID: NORMAL
RAD ONC ARIA PLAN PRESCRIBED DOSE PER FRACTION: 2 GY
RAD ONC ARIA PLAN PRESCRIBED DOSE PER FRACTION: 2 GY
RAD ONC ARIA PLAN PRIMARY REFERENCE POINT: NORMAL
RAD ONC ARIA PLAN PRIMARY REFERENCE POINT: NORMAL
RAD ONC ARIA PLAN TOTAL FRACTIONS PRESCRIBED: 25
RAD ONC ARIA PLAN TOTAL FRACTIONS PRESCRIBED: 25
RAD ONC ARIA PLAN TOTAL PRESCRIBED DOSE: 5000 CGY
RAD ONC ARIA PLAN TOTAL PRESCRIBED DOSE: 5000 CGY
RAD ONC ARIA REFERENCE POINT DOSAGE GIVEN TO DATE: 36 GY
RAD ONC ARIA REFERENCE POINT DOSAGE GIVEN TO DATE: 36 GY
RAD ONC ARIA REFERENCE POINT ID: NORMAL
RAD ONC ARIA REFERENCE POINT ID: NORMAL
RAD ONC ARIA REFERENCE POINT SESSION DOSAGE GIVEN: 2 GY
RAD ONC ARIA REFERENCE POINT SESSION DOSAGE GIVEN: 2 GY

## 2023-09-15 PROCEDURE — 77412 RADIATION TX DELIVERY LVL 3: CPT | Performed by: RADIOLOGY

## 2023-09-15 PROCEDURE — 77387 GUIDANCE FOR RADJ TX DLVR: CPT | Performed by: RADIOLOGY

## 2023-09-15 PROCEDURE — G6002 STEREOSCOPIC X-RAY GUIDANCE: HCPCS | Performed by: RADIOLOGY

## 2023-09-18 ENCOUNTER — HOSPITAL ENCOUNTER (OUTPATIENT)
Dept: RADIATION ONCOLOGY | Facility: HOSPITAL | Age: 68
Discharge: HOME | End: 2023-09-18
Payer: COMMERCIAL

## 2023-09-18 LAB
RAD ONC ARIA COURSE ID: NORMAL
RAD ONC ARIA COURSE INTENT: NORMAL
RAD ONC ARIA COURSE LAST TREATMENT DATE: NORMAL
RAD ONC ARIA COURSE START DATE: NORMAL
RAD ONC ARIA COURSE TREATMENT ELAPSED DAYS: 27
RAD ONC ARIA FIRST TREATMENT DATE: NORMAL
RAD ONC ARIA PLAN FRACTIONS TREATED TO DATE: 19
RAD ONC ARIA PLAN FRACTIONS TREATED TO DATE: 19
RAD ONC ARIA PLAN ID: NORMAL
RAD ONC ARIA PLAN ID: NORMAL
RAD ONC ARIA PLAN PRESCRIBED DOSE PER FRACTION: 2 GY
RAD ONC ARIA PLAN PRESCRIBED DOSE PER FRACTION: 2 GY
RAD ONC ARIA PLAN PRIMARY REFERENCE POINT: NORMAL
RAD ONC ARIA PLAN PRIMARY REFERENCE POINT: NORMAL
RAD ONC ARIA PLAN TOTAL FRACTIONS PRESCRIBED: 25
RAD ONC ARIA PLAN TOTAL FRACTIONS PRESCRIBED: 25
RAD ONC ARIA PLAN TOTAL PRESCRIBED DOSE: 5000 CGY
RAD ONC ARIA PLAN TOTAL PRESCRIBED DOSE: 5000 CGY
RAD ONC ARIA REFERENCE POINT DOSAGE GIVEN TO DATE: 38 GY
RAD ONC ARIA REFERENCE POINT DOSAGE GIVEN TO DATE: 38 GY
RAD ONC ARIA REFERENCE POINT ID: NORMAL
RAD ONC ARIA REFERENCE POINT ID: NORMAL
RAD ONC ARIA REFERENCE POINT SESSION DOSAGE GIVEN: 2 GY
RAD ONC ARIA REFERENCE POINT SESSION DOSAGE GIVEN: 2 GY

## 2023-09-18 PROCEDURE — 77387 GUIDANCE FOR RADJ TX DLVR: CPT | Performed by: RADIOLOGY

## 2023-09-18 PROCEDURE — G6002 STEREOSCOPIC X-RAY GUIDANCE: HCPCS | Performed by: RADIOLOGY

## 2023-09-18 PROCEDURE — 77412 RADIATION TX DELIVERY LVL 3: CPT | Performed by: RADIOLOGY

## 2023-09-19 ENCOUNTER — HOSPITAL ENCOUNTER (OUTPATIENT)
Dept: RADIATION ONCOLOGY | Facility: HOSPITAL | Age: 68
Discharge: HOME | End: 2023-09-19
Payer: COMMERCIAL

## 2023-09-19 ENCOUNTER — HOSPITAL ENCOUNTER (OUTPATIENT)
Dept: RADIATION ONCOLOGY | Facility: HOSPITAL | Age: 68
Discharge: HOME | End: 2023-09-19

## 2023-09-19 VITALS
SYSTOLIC BLOOD PRESSURE: 164 MMHG | TEMPERATURE: 98.1 F | HEART RATE: 48 BPM | OXYGEN SATURATION: 99 % | DIASTOLIC BLOOD PRESSURE: 64 MMHG | WEIGHT: 132.72 LBS | BODY MASS INDEX: 24.67 KG/M2

## 2023-09-19 DIAGNOSIS — Z17.0 MALIGNANT NEOPLASM OF OVERLAPPING SITES OF RIGHT BREAST IN FEMALE, ESTROGEN RECEPTOR POSITIVE: Primary | ICD-10-CM

## 2023-09-19 DIAGNOSIS — C50.811 MALIGNANT NEOPLASM OF OVERLAPPING SITES OF RIGHT BREAST IN FEMALE, ESTROGEN RECEPTOR POSITIVE: Primary | ICD-10-CM

## 2023-09-19 LAB
RAD ONC ARIA COURSE ID: NORMAL
RAD ONC ARIA COURSE INTENT: NORMAL
RAD ONC ARIA COURSE LAST TREATMENT DATE: NORMAL
RAD ONC ARIA COURSE START DATE: NORMAL
RAD ONC ARIA COURSE TREATMENT ELAPSED DAYS: 28
RAD ONC ARIA FIRST TREATMENT DATE: NORMAL
RAD ONC ARIA PLAN FRACTIONS TREATED TO DATE: 20
RAD ONC ARIA PLAN FRACTIONS TREATED TO DATE: 20
RAD ONC ARIA PLAN ID: NORMAL
RAD ONC ARIA PLAN ID: NORMAL
RAD ONC ARIA PLAN PRESCRIBED DOSE PER FRACTION: 2 GY
RAD ONC ARIA PLAN PRESCRIBED DOSE PER FRACTION: 2 GY
RAD ONC ARIA PLAN PRIMARY REFERENCE POINT: NORMAL
RAD ONC ARIA PLAN PRIMARY REFERENCE POINT: NORMAL
RAD ONC ARIA PLAN TOTAL FRACTIONS PRESCRIBED: 25
RAD ONC ARIA PLAN TOTAL FRACTIONS PRESCRIBED: 25
RAD ONC ARIA PLAN TOTAL PRESCRIBED DOSE: 5000 CGY
RAD ONC ARIA PLAN TOTAL PRESCRIBED DOSE: 5000 CGY
RAD ONC ARIA REFERENCE POINT DOSAGE GIVEN TO DATE: 40 GY
RAD ONC ARIA REFERENCE POINT DOSAGE GIVEN TO DATE: 40 GY
RAD ONC ARIA REFERENCE POINT ID: NORMAL
RAD ONC ARIA REFERENCE POINT ID: NORMAL
RAD ONC ARIA REFERENCE POINT SESSION DOSAGE GIVEN: 2 GY
RAD ONC ARIA REFERENCE POINT SESSION DOSAGE GIVEN: 2 GY

## 2023-09-19 PROCEDURE — G6002 STEREOSCOPIC X-RAY GUIDANCE: HCPCS | Performed by: RADIOLOGY

## 2023-09-19 PROCEDURE — 77412 RADIATION TX DELIVERY LVL 3: CPT | Performed by: RADIOLOGY

## 2023-09-19 PROCEDURE — 77336 RADIATION PHYSICS CONSULT: CPT | Performed by: RADIOLOGY

## 2023-09-19 PROCEDURE — 77387 GUIDANCE FOR RADJ TX DLVR: CPT | Performed by: RADIOLOGY

## 2023-09-19 NOTE — PROGRESS NOTES
On Treatment Visit       Patient: Arely Huerta   YOB: 1955   Medical Record Number: [id number]     Date of Visit  September 19, 2023   Primary Diagnosis:Malignant neoplasm of overlapping sites of right breast in female, estrogen receptor positive [C50.811, Z17.0]  Cancer Staging: Cancer Staging   Malignant neoplasm of overlapping sites of right breast in female, estrogen receptor positive  Staging form: Breast, AJCC 8th Edition  - Clinical: cT1c, cN0, cM0, ER+, ND+, HER2- - Signed by David Vazquez MD on 5/18/2023  - Pathologic: pT1c, pN1a, cM0, Oncotype DX score: 19 - Signed by David Vazquez MD on 7/20/2023         was seen today for an on treatment visit.  She is receiving radiation therapy to the right breast and axilla. She  has received 4000 cGy in 20 fractions out of a planned dose of 5000 cGy in 25 fractions.    Development of right breast redness with no pain.  Moisturizing is advised                                            Review of Systems:   Review of Systems   Constitutional:  Positive for fatigue (7/10). Negative for appetite change and unexpected weight change.   HENT:  Negative for hearing loss, sore throat, tinnitus and trouble swallowing.    Eyes:  Positive for visual disturbance (has very minimal vision in right eye).        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     Respiratory:  Negative for cough and shortness of breath.    Cardiovascular:  Negative for chest pain, palpitations and leg swelling.        Hx of mitral valve prolapse. Taking Atenolol for this.  Per pt, her cardiologist likes to keep her heart rate around 50.    Gastrointestinal:  Positive for abdominal distention (BULDGE ON RIGHT SIDE, CONCERN FOR HERMIA. PLANS TO SEE PCP). Negative for anal bleeding, blood in stool, constipation, diarrhea, nausea and vomiting.   Endocrine: Positive for heat intolerance (occasionally since surgery, improving).   Genitourinary:   Negative for difficulty urinating, dysuria, frequency, hematuria and urgency.   Musculoskeletal:  Positive for arthralgias (Ongoing), back pain (ongoing- ankylosing spondylitis), neck pain (d/t arthritis- ongoing, tender retated to tx) and neck stiffness (d/t arthritis- ongoing). Negative for joint swelling.   Skin:  Positive for color change (redness at tx site). Negative for rash.        New lump found in right breast x2 weeks    Allergic/Immunologic: Negative.    Neurological:  Positive for headaches (frequent mild headaches, taking excedrine for this with relief- ongoing since young adult). Negative for dizziness and weakness.   Hematological:  Bruises/bleeds easily (takes aspirin for headaches.).   Psychiatric/Behavioral:  Negative for sleep disturbance.      Vitals:     Vitals:    09/19/23 1108   BP: 164/64   Pulse: (!) 48   Temp: 98.1 °F (36.7 °C)   SpO2: 99%       Weight:   Wt Readings from Last 3 Encounters:   09/19/23 60.2 kg (132 lb 11.5 oz)   09/12/23 60 kg (132 lb 4.4 oz)   09/05/23 59.7 kg (131 lb 9.8 oz)      Pain:    Pain Score    09/19/23 1108   PainSc: 0-No pain         Physical Exam:  Gen: WD/WN; NAD  HEENT: MMM  Trachea: midline  Chest: symmetric; moderate right breast rubor  Abdomen: No palpable mass  Resp: normal respiratory effort  Extr: warm, well-perfused  Neuro: awake and alert; no aphasia or neglect    Plan: I have reviewed treatment setup notes, checked and approved the daily guidance images.  I reviewed dose delivery, treatment parameters and deemed them appropriate. We plan to continue radiation therapy as prescribed.      Radiation Oncology    Electronically signed by Orion Mckinney MD 9/19/2023  12:55 EDT

## 2023-09-20 ENCOUNTER — HOSPITAL ENCOUNTER (OUTPATIENT)
Dept: RADIATION ONCOLOGY | Facility: HOSPITAL | Age: 68
Discharge: HOME | End: 2023-09-20
Payer: COMMERCIAL

## 2023-09-20 LAB
RAD ONC ARIA COURSE ID: NORMAL
RAD ONC ARIA COURSE INTENT: NORMAL
RAD ONC ARIA COURSE LAST TREATMENT DATE: NORMAL
RAD ONC ARIA COURSE START DATE: NORMAL
RAD ONC ARIA COURSE TREATMENT ELAPSED DAYS: 29
RAD ONC ARIA FIRST TREATMENT DATE: NORMAL
RAD ONC ARIA PLAN FRACTIONS TREATED TO DATE: 21
RAD ONC ARIA PLAN FRACTIONS TREATED TO DATE: 21
RAD ONC ARIA PLAN ID: NORMAL
RAD ONC ARIA PLAN ID: NORMAL
RAD ONC ARIA PLAN PRESCRIBED DOSE PER FRACTION: 2 GY
RAD ONC ARIA PLAN PRESCRIBED DOSE PER FRACTION: 2 GY
RAD ONC ARIA PLAN PRIMARY REFERENCE POINT: NORMAL
RAD ONC ARIA PLAN PRIMARY REFERENCE POINT: NORMAL
RAD ONC ARIA PLAN TOTAL FRACTIONS PRESCRIBED: 25
RAD ONC ARIA PLAN TOTAL FRACTIONS PRESCRIBED: 25
RAD ONC ARIA PLAN TOTAL PRESCRIBED DOSE: 5000 CGY
RAD ONC ARIA PLAN TOTAL PRESCRIBED DOSE: 5000 CGY
RAD ONC ARIA REFERENCE POINT DOSAGE GIVEN TO DATE: 42 GY
RAD ONC ARIA REFERENCE POINT DOSAGE GIVEN TO DATE: 42 GY
RAD ONC ARIA REFERENCE POINT ID: NORMAL
RAD ONC ARIA REFERENCE POINT ID: NORMAL
RAD ONC ARIA REFERENCE POINT SESSION DOSAGE GIVEN: 2 GY
RAD ONC ARIA REFERENCE POINT SESSION DOSAGE GIVEN: 2 GY

## 2023-09-20 PROCEDURE — 77412 RADIATION TX DELIVERY LVL 3: CPT | Performed by: RADIOLOGY

## 2023-09-21 ENCOUNTER — HOSPITAL ENCOUNTER (OUTPATIENT)
Dept: RADIATION ONCOLOGY | Facility: HOSPITAL | Age: 68
Discharge: HOME | End: 2023-09-21
Payer: COMMERCIAL

## 2023-09-21 LAB
RAD ONC ARIA COURSE ID: NORMAL
RAD ONC ARIA COURSE INTENT: NORMAL
RAD ONC ARIA COURSE LAST TREATMENT DATE: NORMAL
RAD ONC ARIA COURSE START DATE: NORMAL
RAD ONC ARIA COURSE TREATMENT ELAPSED DAYS: 30
RAD ONC ARIA FIRST TREATMENT DATE: NORMAL
RAD ONC ARIA PLAN FRACTIONS TREATED TO DATE: 22
RAD ONC ARIA PLAN FRACTIONS TREATED TO DATE: 22
RAD ONC ARIA PLAN ID: NORMAL
RAD ONC ARIA PLAN ID: NORMAL
RAD ONC ARIA PLAN PRESCRIBED DOSE PER FRACTION: 2 GY
RAD ONC ARIA PLAN PRESCRIBED DOSE PER FRACTION: 2 GY
RAD ONC ARIA PLAN PRIMARY REFERENCE POINT: NORMAL
RAD ONC ARIA PLAN PRIMARY REFERENCE POINT: NORMAL
RAD ONC ARIA PLAN TOTAL FRACTIONS PRESCRIBED: 25
RAD ONC ARIA PLAN TOTAL FRACTIONS PRESCRIBED: 25
RAD ONC ARIA PLAN TOTAL PRESCRIBED DOSE: 5000 CGY
RAD ONC ARIA PLAN TOTAL PRESCRIBED DOSE: 5000 CGY
RAD ONC ARIA REFERENCE POINT DOSAGE GIVEN TO DATE: 44 GY
RAD ONC ARIA REFERENCE POINT DOSAGE GIVEN TO DATE: 44 GY
RAD ONC ARIA REFERENCE POINT ID: NORMAL
RAD ONC ARIA REFERENCE POINT ID: NORMAL
RAD ONC ARIA REFERENCE POINT SESSION DOSAGE GIVEN: 2 GY
RAD ONC ARIA REFERENCE POINT SESSION DOSAGE GIVEN: 2 GY

## 2023-09-21 PROCEDURE — 77412 RADIATION TX DELIVERY LVL 3: CPT | Performed by: RADIOLOGY

## 2023-09-21 PROCEDURE — 77387 GUIDANCE FOR RADJ TX DLVR: CPT | Performed by: RADIOLOGY

## 2023-09-21 PROCEDURE — G6002 STEREOSCOPIC X-RAY GUIDANCE: HCPCS | Performed by: RADIOLOGY

## 2023-09-22 ENCOUNTER — HOSPITAL ENCOUNTER (OUTPATIENT)
Dept: RADIATION ONCOLOGY | Facility: HOSPITAL | Age: 68
Discharge: HOME | End: 2023-09-22
Payer: COMMERCIAL

## 2023-09-22 LAB
RAD ONC ARIA COURSE ID: NORMAL
RAD ONC ARIA COURSE INTENT: NORMAL
RAD ONC ARIA COURSE LAST TREATMENT DATE: NORMAL
RAD ONC ARIA COURSE START DATE: NORMAL
RAD ONC ARIA COURSE TREATMENT ELAPSED DAYS: 31
RAD ONC ARIA FIRST TREATMENT DATE: NORMAL
RAD ONC ARIA PLAN FRACTIONS TREATED TO DATE: 23
RAD ONC ARIA PLAN FRACTIONS TREATED TO DATE: 23
RAD ONC ARIA PLAN ID: NORMAL
RAD ONC ARIA PLAN ID: NORMAL
RAD ONC ARIA PLAN PRESCRIBED DOSE PER FRACTION: 2 GY
RAD ONC ARIA PLAN PRESCRIBED DOSE PER FRACTION: 2 GY
RAD ONC ARIA PLAN PRIMARY REFERENCE POINT: NORMAL
RAD ONC ARIA PLAN PRIMARY REFERENCE POINT: NORMAL
RAD ONC ARIA PLAN TOTAL FRACTIONS PRESCRIBED: 25
RAD ONC ARIA PLAN TOTAL FRACTIONS PRESCRIBED: 25
RAD ONC ARIA PLAN TOTAL PRESCRIBED DOSE: 5000 CGY
RAD ONC ARIA PLAN TOTAL PRESCRIBED DOSE: 5000 CGY
RAD ONC ARIA REFERENCE POINT DOSAGE GIVEN TO DATE: 46 GY
RAD ONC ARIA REFERENCE POINT DOSAGE GIVEN TO DATE: 46 GY
RAD ONC ARIA REFERENCE POINT ID: NORMAL
RAD ONC ARIA REFERENCE POINT ID: NORMAL
RAD ONC ARIA REFERENCE POINT SESSION DOSAGE GIVEN: 2 GY
RAD ONC ARIA REFERENCE POINT SESSION DOSAGE GIVEN: 2 GY

## 2023-09-22 PROCEDURE — 77387 GUIDANCE FOR RADJ TX DLVR: CPT | Performed by: RADIOLOGY

## 2023-09-22 PROCEDURE — 77412 RADIATION TX DELIVERY LVL 3: CPT | Performed by: RADIOLOGY

## 2023-09-22 PROCEDURE — G6002 STEREOSCOPIC X-RAY GUIDANCE: HCPCS | Performed by: RADIOLOGY

## 2023-09-25 ENCOUNTER — HOSPITAL ENCOUNTER (OUTPATIENT)
Dept: RADIATION ONCOLOGY | Facility: HOSPITAL | Age: 68
Discharge: HOME | End: 2023-09-25
Payer: COMMERCIAL

## 2023-09-25 LAB
RAD ONC ARIA COURSE ID: NORMAL
RAD ONC ARIA COURSE INTENT: NORMAL
RAD ONC ARIA COURSE LAST TREATMENT DATE: NORMAL
RAD ONC ARIA COURSE START DATE: NORMAL
RAD ONC ARIA COURSE TREATMENT ELAPSED DAYS: 34
RAD ONC ARIA FIRST TREATMENT DATE: NORMAL
RAD ONC ARIA PLAN FRACTIONS TREATED TO DATE: 24
RAD ONC ARIA PLAN FRACTIONS TREATED TO DATE: 24
RAD ONC ARIA PLAN ID: NORMAL
RAD ONC ARIA PLAN ID: NORMAL
RAD ONC ARIA PLAN PRESCRIBED DOSE PER FRACTION: 2 GY
RAD ONC ARIA PLAN PRESCRIBED DOSE PER FRACTION: 2 GY
RAD ONC ARIA PLAN PRIMARY REFERENCE POINT: NORMAL
RAD ONC ARIA PLAN PRIMARY REFERENCE POINT: NORMAL
RAD ONC ARIA PLAN TOTAL FRACTIONS PRESCRIBED: 25
RAD ONC ARIA PLAN TOTAL FRACTIONS PRESCRIBED: 25
RAD ONC ARIA PLAN TOTAL PRESCRIBED DOSE: 5000 CGY
RAD ONC ARIA PLAN TOTAL PRESCRIBED DOSE: 5000 CGY
RAD ONC ARIA REFERENCE POINT DOSAGE GIVEN TO DATE: 48 GY
RAD ONC ARIA REFERENCE POINT DOSAGE GIVEN TO DATE: 48 GY
RAD ONC ARIA REFERENCE POINT ID: NORMAL
RAD ONC ARIA REFERENCE POINT ID: NORMAL
RAD ONC ARIA REFERENCE POINT SESSION DOSAGE GIVEN: 2 GY
RAD ONC ARIA REFERENCE POINT SESSION DOSAGE GIVEN: 2 GY

## 2023-09-25 PROCEDURE — 77412 RADIATION TX DELIVERY LVL 3: CPT | Performed by: RADIOLOGY

## 2023-09-25 PROCEDURE — 77387 GUIDANCE FOR RADJ TX DLVR: CPT | Performed by: RADIOLOGY

## 2023-09-26 ENCOUNTER — HOSPITAL ENCOUNTER (OUTPATIENT)
Dept: RADIATION ONCOLOGY | Facility: HOSPITAL | Age: 68
Discharge: HOME | End: 2023-09-26

## 2023-09-26 ENCOUNTER — HOSPITAL ENCOUNTER (OUTPATIENT)
Dept: RADIATION ONCOLOGY | Facility: HOSPITAL | Age: 68
Discharge: HOME | End: 2023-09-26
Payer: COMMERCIAL

## 2023-09-26 VITALS
HEART RATE: 47 BPM | TEMPERATURE: 97.9 F | DIASTOLIC BLOOD PRESSURE: 80 MMHG | SYSTOLIC BLOOD PRESSURE: 142 MMHG | WEIGHT: 133.6 LBS | OXYGEN SATURATION: 100 % | BODY MASS INDEX: 24.84 KG/M2 | RESPIRATION RATE: 16 BRPM

## 2023-09-26 DIAGNOSIS — C50.811 MALIGNANT NEOPLASM OF OVERLAPPING SITES OF RIGHT BREAST IN FEMALE, ESTROGEN RECEPTOR POSITIVE: Primary | ICD-10-CM

## 2023-09-26 DIAGNOSIS — Z17.0 MALIGNANT NEOPLASM OF OVERLAPPING SITES OF RIGHT BREAST IN FEMALE, ESTROGEN RECEPTOR POSITIVE: Primary | ICD-10-CM

## 2023-09-26 LAB
RAD ONC ARIA COURSE ID: NORMAL
RAD ONC ARIA COURSE INTENT: NORMAL
RAD ONC ARIA COURSE LAST TREATMENT DATE: NORMAL
RAD ONC ARIA COURSE START DATE: NORMAL
RAD ONC ARIA COURSE TREATMENT ELAPSED DAYS: 35
RAD ONC ARIA FIRST TREATMENT DATE: NORMAL
RAD ONC ARIA PLAN FRACTIONS TREATED TO DATE: 25
RAD ONC ARIA PLAN FRACTIONS TREATED TO DATE: 25
RAD ONC ARIA PLAN ID: NORMAL
RAD ONC ARIA PLAN ID: NORMAL
RAD ONC ARIA PLAN PRESCRIBED DOSE PER FRACTION: 2 GY
RAD ONC ARIA PLAN PRESCRIBED DOSE PER FRACTION: 2 GY
RAD ONC ARIA PLAN PRIMARY REFERENCE POINT: NORMAL
RAD ONC ARIA PLAN PRIMARY REFERENCE POINT: NORMAL
RAD ONC ARIA PLAN TOTAL FRACTIONS PRESCRIBED: 25
RAD ONC ARIA PLAN TOTAL FRACTIONS PRESCRIBED: 25
RAD ONC ARIA PLAN TOTAL PRESCRIBED DOSE: 5000 CGY
RAD ONC ARIA PLAN TOTAL PRESCRIBED DOSE: 5000 CGY
RAD ONC ARIA REFERENCE POINT DOSAGE GIVEN TO DATE: 50 GY
RAD ONC ARIA REFERENCE POINT DOSAGE GIVEN TO DATE: 50 GY
RAD ONC ARIA REFERENCE POINT ID: NORMAL
RAD ONC ARIA REFERENCE POINT ID: NORMAL
RAD ONC ARIA REFERENCE POINT SESSION DOSAGE GIVEN: 2 GY
RAD ONC ARIA REFERENCE POINT SESSION DOSAGE GIVEN: 2 GY

## 2023-09-26 PROCEDURE — 77412 RADIATION TX DELIVERY LVL 3: CPT | Performed by: RADIOLOGY

## 2023-09-26 PROCEDURE — 77387 GUIDANCE FOR RADJ TX DLVR: CPT | Performed by: RADIOLOGY

## 2023-09-26 PROCEDURE — 77336 RADIATION PHYSICS CONSULT: CPT | Performed by: RADIOLOGY

## 2023-09-26 NOTE — PROGRESS NOTES
On Treatment Visit       Patient: Arely Huerta   YOB: 1955   Medical Record Number: [id number]     Date of Visit  September 26, 2023   Primary Diagnosis:Malignant neoplasm of overlapping sites of right breast in female, estrogen receptor positive [C50.811, Z17.0]  Cancer Staging: Cancer Staging   Malignant neoplasm of overlapping sites of right breast in female, estrogen receptor positive  Staging form: Breast, AJCC 8th Edition  - Clinical: cT1c, cN0, cM0, ER+, CA+, HER2- - Signed by David Vazquez MD on 5/18/2023  - Pathologic: pT1c, pN1a, cM0, Oncotype DX score: 19 - Signed by David Vazquez MD on 7/20/2023         was seen today for an on treatment visit.  She is receiving radiation therapy to the right breast and axilla. She  has received 5000 cGy in 25 fractions out of a planned dose of 5000 cGy in 25 fractions.    Persistence of bright red skin                                            Review of Systems:   Review of Systems   Constitutional:  Positive for fatigue (3/10). Negative for appetite change and unexpected weight change.   HENT:  Negative for hearing loss, sore throat, tinnitus and trouble swallowing.    Eyes:  Positive for visual disturbance (has very minimal vision in right eye).        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     Respiratory:  Negative for cough and shortness of breath.    Cardiovascular:  Negative for chest pain, palpitations and leg swelling.        Hx of mitral valve prolapse. Taking Atenolol for this.  Per pt, her cardiologist likes to keep her heart rate around 50.    Gastrointestinal:  Positive for abdominal distention (BULDGE ON RIGHT SIDE, CONCERN FOR HERMIA. PLANS TO SEE PCP). Negative for anal bleeding, blood in stool, constipation, diarrhea, nausea and vomiting.   Endocrine: Positive for heat intolerance (occasionally since surgery, improving).   Genitourinary:  Negative for difficulty urinating, dysuria,  frequency, hematuria and urgency.   Musculoskeletal:  Positive for arthralgias (Ongoing), back pain (ongoing- ankylosing spondylitis), neck pain (d/t arthritis- ongoing, tender retated to tx) and neck stiffness (d/t arthritis- ongoing). Negative for joint swelling.   Skin:  Positive for color change (redness at txt site) and wound (open area on Right shoulder blade). Negative for rash.        New lump found in right breast x2 weeks    Allergic/Immunologic: Negative.    Neurological:  Positive for headaches (frequent mild headaches, taking excedrine for this with relief- ongoing since young adult). Negative for dizziness and weakness.   Hematological:  Bruises/bleeds easily (takes aspirin for headaches.).   Psychiatric/Behavioral:  Negative for sleep disturbance.      Vitals:     Vitals:    09/26/23 1137   BP: 142/80   Pulse: (!) 47   Resp: 16   Temp: 97.9 °F (36.6 °C)   SpO2: 100%       Weight:   Wt Readings from Last 3 Encounters:   09/26/23 60.6 kg (133 lb 9.6 oz)   09/19/23 60.2 kg (132 lb 11.5 oz)   09/12/23 60 kg (132 lb 4.4 oz)      Pain:    Pain Score    09/26/23 1137   PainSc:   3         Physical Exam:  Gen: WD/WN; NAD  HEENT: MMM  Trachea: midline  Chest: symmetric; right breast bright rubor  Abdomen: No palpable mass  Resp: normal respiratory effort  Extr: warm, well-perfused  Neuro: awake and alert; no aphasia or neglect    Plan: I have reviewed treatment setup notes, checked and approved the daily guidance images.  I reviewed dose delivery, treatment parameters and deemed them appropriate. We plan to continue radiation therapy as prescribed.          Radiation Oncology    Electronically signed by Orion Mckinney MD 9/26/2023  22:10 EDT

## 2023-10-11 ENCOUNTER — APPOINTMENT (OUTPATIENT)
Dept: OCCUPATIONAL THERAPY | Facility: HOSPITAL | Age: 68
End: 2023-10-11
Payer: COMMERCIAL

## 2023-10-17 ENCOUNTER — PATIENT OUTREACH (OUTPATIENT)
Dept: ONCOLOGY | Facility: HOSPITAL | Age: 68
End: 2023-10-17
Payer: COMMERCIAL

## 2023-11-08 ENCOUNTER — DOCUMENTATION (OUTPATIENT)
Dept: RADIATION ONCOLOGY | Facility: HOSPITAL | Age: 68
End: 2023-11-08
Payer: COMMERCIAL

## 2023-11-08 NOTE — PROGRESS NOTES
"Patient completed radiation treatment for Breast cancer on 09/26/2023. Following up with patient regarding any concerns the patient may have at this time and receiving feedback in regards to patient over all care while under treatment.     Patient states that there is a slight discoloration below the areola on the R breast and complains of an achy sensation at the incision site.  She will be following up with Ms. Riggins with Plastics in December to discuss if there is anything that can improve the appearance.    Patient states that Pain is at 1 on scale of 0/10 achy sensation at the incision site. Patient states medications have not changed and no refills needed at this time.    Patient was asked if there was anything that could've made their experience better at [facility] while they were under treatment. Patient states: \"You all were all excellent and made the process easy, wouldn't change a thing.\"    Patient encouraged to call the office if any concerns arise. Patient reminded of Follow up appointment on 11/30/2023   "

## 2023-11-15 ENCOUNTER — TELEPHONE (OUTPATIENT)
Dept: GENETICS | Facility: HOSPITAL | Age: 68
End: 2023-11-15
Payer: COMMERCIAL

## 2023-11-15 NOTE — TELEPHONE ENCOUNTER
Called pt to remind them of their upcoming genetic counseling appt. Left message asking pt to call me back to review family history prior to appt.

## 2023-11-16 ENCOUNTER — HOSPITAL ENCOUNTER (OUTPATIENT)
Dept: OCCUPATIONAL THERAPY | Facility: HOSPITAL | Age: 68
Setting detail: THERAPIES SERIES
Discharge: HOME | End: 2023-11-16
Payer: COMMERCIAL

## 2023-11-16 DIAGNOSIS — C50.811 MALIGNANT NEOPLASM OF OVERLAPPING SITES OF RIGHT BREAST IN FEMALE, ESTROGEN RECEPTOR POSITIVE: ICD-10-CM

## 2023-11-16 DIAGNOSIS — Z91.89 AT RISK FOR LYMPHEDEMA: Primary | ICD-10-CM

## 2023-11-16 DIAGNOSIS — C50.411 MALIGNANT NEOPLASM OF UPPER-OUTER QUADRANT OF RIGHT BREAST IN FEMALE, ESTROGEN RECEPTOR POSITIVE: ICD-10-CM

## 2023-11-16 DIAGNOSIS — Z17.0 MALIGNANT NEOPLASM OF UPPER-OUTER QUADRANT OF RIGHT BREAST IN FEMALE, ESTROGEN RECEPTOR POSITIVE: ICD-10-CM

## 2023-11-16 DIAGNOSIS — Z17.0 MALIGNANT NEOPLASM OF OVERLAPPING SITES OF RIGHT BREAST IN FEMALE, ESTROGEN RECEPTOR POSITIVE: ICD-10-CM

## 2023-11-16 PROCEDURE — 97535 SELF CARE MNGMENT TRAINING: CPT

## 2023-11-16 PROCEDURE — 93702 BIS XTRACELL FLUID ANALYSIS: CPT

## 2023-11-16 NOTE — THERAPY RE-EVALUATION
Outpatient Occupational Therapy Lymphedema Re-Evaluation   Jesu     Patient Name: Arely Huerta  : 1955  MRN: [id number]  Today's Date: 2023      Visit Date: 2023    Patient Active Problem List   Diagnosis    Malignant neoplasm of overlapping sites of right breast in female, estrogen receptor positive    Allergic rhinitis    Ankylosing spondylitis    Arthritis    Asthma    Benign essential HTN    Bladder disorder    Breast lump    COPD (chronic obstructive pulmonary disease)    High blood pressure    PVD (peripheral vascular disease)    High cholesterol    Hypothyroidism    Leukopenia    Mitral valve prolapse    Neurologic disorder    Primary hypercoagulable state    Rectal bleeding    Shortness of breath    Disproportion between native breast and reconstructed breast    Osteopenia of multiple sites        Past Medical History:   Diagnosis Date    Acid reflux     Allergies     Ankylosing spondylitis     Breast cancer     Hypercholesteremia     Hypertension     Skin cancer     Thyroid condition         Past Surgical History:   Procedure Laterality Date    APPENDECTOMY      BREAST BIOPSY Right 2023    Procedure: RIGHT BREAST LUMPECTOMY WITH NEEDLE LOCALIZATION;  Surgeon: Laura Goff MD;  Location: [facility] OR [facility] [place];  Service: General;  Laterality: Right;    BREAST RECONSTRUCTION Bilateral 2023    Procedure: Bilateral breast reconstruction, left breast reduction, right breast oncoplastic closure with mastopexy;  Surgeon: Caren Martins MD;  Location: [facility] OR [facility] [place];  Service: Plastics;  Laterality: Bilateral;    CORNEAL TRANSPLANT Right     FOOT SURGERY      SINUS SURGERY      x6    TUBAL ABDOMINAL LIGATION           Visit Dx:     ICD-10-CM ICD-9-CM   1. At risk for lymphedema  Z91.89 V49.89   2. Malignant neoplasm of upper-outer quadrant of right breast in female, estrogen receptor positive  C50.411 174.4    Z17.0 V86.0   3. Malignant neoplasm of overlapping sites of  right breast in female, estrogen receptor positive  C50.811 174.8    Z17.0 V86.0        Patient History       Row Name 11/16/23 0900             History    Chief Complaint Other 1 (comment)  Lymphedema surveillance program  -SF      Brief Description of Current Complaint Arely is a 68 y.o. female with a diagnosis of invasive ductal carcinoma ER/WI + HER2- of the right breast.  Pt will underwent a lumpectomy on 6/20/23.  -SF         Fall Risk Assessment    Any falls in the past year: No  -SF      Does patient have a fear of falling No  -SF         Services    Are you currently receiving Home Health services No  -SF      Do you plan to receive Home Health services in the near future No  -SF         Daily Activities    Primary Language English  -SF      Are you able to read Yes  -SF      Are you able to write Yes  -SF      How does patient learn best? Listening;Reading;Demonstration;Pictures/Video  -SF      Pt Participated in POC and Goals Yes  -SF         Safety    Are you being hurt, hit, or frightened by anyone at home or in your life? No  -SF      Are you being neglected by a caregiver No  -SF      Have you had any of the following issues with N/A  -SF                User Key  (r) = Recorded By, (t) = Taken By, (c) = Cosigned By      Initials Name Provider Type    SF Rosa Mi OT Occupational Therapist                     Lymphedema       Row Name 11/16/23 0900             Subjective Pain    Able to rate subjective pain? yes  -SF      Pre-Treatment Pain Level 0  -SF      Post-Treatment Pain Level 0  -SF         Subjective    Subjective Comments Patient reports no concerns.  -SF         Lymphedema Assessment    Lymphedema Classification RUE:;at risk/stage 0  -SF      Lymphedema Cancer Related Sx right;lumpectomy;simple mastectomy  -SF      Lymphedema Surgery Comments 06/20/2023  -SF      Lymph Nodes Removed # 2  -SF      Positive Lymph Nodes # 0  -SF      Chemo Received no  -SF      Radiation Therapy Received yes  " -SF      Radiation Treatments #/Timeframe 25  -SF         LLIS - Physical Concerns    The amount of pain associated with my lymphedema is: 0  -SF      The amount of limb heaviness associated with my lymphedema is: 0  -SF      The amount of skin tightness associated with my lymphedema is: 0  -SF      The size of my swollen limb(s) seems: 0  -SF      Lymphedema affects the movement of my swollen limb(s): 0  -SF      The strength in my swollen limb(s) is: 0  -SF         LLIS - Psychosocial Concerns    Lymphedema affects my body image (i.e., \"how I think I look\"). 0  -SF      Lymphedema affects my socializing with others. 0  -SF      Lymphedema affects my intimate relations with spouse or partner (rate 0 if not applicable 0  -SF      Lymphedema \"gets me down\" (i.e., depression, frustration, or anger) 0  -SF      I must rely on others for help due to my lymphedema. 0  -SF      I know what to do to manage my lymphedema 2  -SF         LLIS - Functional Concerns    Lymphedema affects my ability to perform self-care activities (i.e. eating, dressing, hygiene) 0  -SF      Lymphedema affects my ability to perform routine home or work-related activities. 0  -SF      Lymphedema affects my performance of preferred leisure activities. 0  -SF      Lymphedema affects proper fit of clothing/shoes 0  -SF      Lymphedema affects my sleep 0  -SF         Posture/Observations    Posture- WNL Posture is WNL  -SF         General ROM    GENERAL ROM COMMENTS BUE WFL  -SF         MMT (Manual Muscle Testing)    General MMT Comments BUE WFL  -SF         Lymphedema Measurements    Measurement Type(s) Volumetric  -SF      Volumetric Areas Upper extremities  -SF         L-Dex Bioimpedence Screening    L-Dex Measurement Extremity RUE  -SF      L-Dex Patient Position Standing  -SF      L-Dex UE Dominate Side Left  -SF      L-Dex UE At Risk Side Right  -SF      L-Dex UE Pre Surgical Value Yes  -SF      L-Dex UE Score 8.3  -SF      L-Dex UE Baseline " Score -0.9  -SF      L-Dex UE Value Change 9.2  -SF      $ L-Dex Charge yes  -SF         Lymphedema Life Impact Scale Totals    A.  Total Q1 - Q17 (Do not include Q18) 2  -SF      B.  Total number of questions answered (Q1-Q17) 17  -SF      C. Divide A by B 0.12  -SF      D. Multiple C by 25 3  -SF                User Key  (r) = Recorded By, (t) = Taken By, (c) = Cosigned By      Initials Name Provider Type    Rosa Azevedo OT Occupational Therapist                            Therapy Education  Education Details: Therapist educating patient to engage in HEP and self MLD daily. Therapist providing patient w/ copies of handouts on this date to help w/ carryover. Therapist also recommending patient utilize compression bra daily. Therapist educating patient that utilizing a compression sleeve could be indicated to control her lymphatic functioning if interventions do not provide adequate change in L-dex measurements. Patient verbalized understanding and was in agreement with plan.  Given: HEP, Symptoms/condition management, Edema management  Program: New, Reinforced  How Provided: Verbal, Demonstration  Provided to: Patient  Level of Understanding: Teach back education performed, Verbalized  79981 - OT Self Care/Mgmt Minutes: 20         OT Goals       Row Name 11/16/23 1133          Time Calculation    OT Goal Re-Cert Due Date 12/16/23  -               User Key  (r) = Recorded By, (t) = Taken By, (c) = Cosigned By      Initials Name Provider Type    Rosa Azevedo OT Occupational Therapist                  GOALS:  1. Post Breast Surgery Care/at risk for Lymphedema  LTG 1: 90 days:  As an indicator of no exacerbation of lymphedema staging, the patient will present with an L-Dex score less than [10] points from preoperative baseline.              STATUS: on going  STG 1a:   30 days: To prevent exacerbation of mixed edema to lymphedema, patient will utilize the 2 postsurgical compression garments daily.                  STATUS: on going  STG 1b: 30 days: Patient will be independent with self-manual lymphatic massage.               STATUS: on going  STG 1c: 30 days:  Patient will be independent with identification of signs and symptoms of lymphedema exasperation per stoplight to recovery education handout.              STATUS: on going  STG 1 d: 30 days: Patient will be independent with HEP to prevent advancement in lymphedema staging.              STATUS: on going  TREATMENT:  Self Care/ADL retraining, Therapeutic Activity, Neuromuscular Re-education, Therapeutic Exercise, Bioimpedence Fluid Analysis, Post-Surgical compression garement [address] Justina Zip-ST-High/ Carla Camisole Kit 2860K, Orthotic Management and training,  and Manual Therapy.        OT Assessment/Plan       Row Name 11/16/23 0932          OT Assessment    Functional Limitations Limitations in functional capacity and performance  -SF     Impairments Impaired lymphatic circulation  -SF     Assessment Comments Patient is a 68-year-old female with diagnosis of invasive ductal carcinoma ER/IA+ HER2-of the right breast.  Patient underwent lumpectomy on 6/20/2023.  Patient is demonstrating increase in L-dex measurements on this date. Patient was given recommendations to utilize HEP, self MLD and compression bra for the next 2 weeks and will be re-measured. Patient will continue to benefit from skilled occupational therapy to further evaluate ongoing lymphatic functioning to prevent advancement in lymphedema staging.  -SF     OT Diagnosis At risk for lymphedema  -SF     OT Rehab Potential Good  -SF     Patient/caregiver participated in establishment of treatment plan and goals Yes  -SF     Patient would benefit from skilled therapy intervention Yes  -SF        OT Plan    OT Frequency Other (comment)  -SF     Predicted Duration of Therapy Intervention (OT) 2 week follow up  -SF     Planned CPT's? OT EVAL LOW COMPLEXITY: 24275;OT RE-EVAL: 04408;OT THER ACT EA 15 MIN:  [id number];OT SELF CARE/MGMT/TRAIN 15 MIN: 64227;OT MANUAL THERAPY EA 15 MIN: 72483;OT BIS XTRACELL FLUID ANALYSIS: 06794;OT CARE PLAN EA 15 MIN;OT ORTHOTIC MGMT/TRAIN EA 15 MIN: 65931;OT ORTHO/PROSTHET CHECKOUT EA 15 MIN: 12440  -SF     Planned Therapy Interventions (Optional Details) home exercise program;manual therapy techniques;stretching;strengthening;ROM (Range of Motion);prosthetic fitting/training;patient/family education;orthotic fitting/training  -SF     OT Plan Comments Continue POC  -SF               User Key  (r) = Recorded By, (t) = Taken By, (c) = Cosigned By      Initials Name Provider Type    SF Rosa Mi OT Occupational Therapist                              Time Calculation:   Timed Charges  97992 - OT Self Care/Mgmt Minutes: 20  Total Minutes  Timed Charges Total Minutes: 20   Total Minutes: 20     Therapy Charges for Today       Code Description Service Date Service Provider Modifiers Qty    26658930382 HC PT BIS XTRACELL FLUID ANALYSIS 11/16/2023 Rosa Mi OT  1    [id number]  OT SELF CARE/MGMT/TRAIN EA 15 MIN 11/16/2023 Rosa Mi OT GO 1                      Rosa Mi OT  11/16/2023

## 2023-11-20 ENCOUNTER — CLINICAL SUPPORT (OUTPATIENT)
Dept: GENETICS | Facility: HOSPITAL | Age: 68
End: 2023-11-20
Payer: COMMERCIAL

## 2023-11-20 DIAGNOSIS — Z80.3 FAMILY HISTORY OF MALIGNANT NEOPLASM OF BREAST: ICD-10-CM

## 2023-11-20 DIAGNOSIS — Z17.0 MALIGNANT NEOPLASM OF RIGHT BREAST IN FEMALE, ESTROGEN RECEPTOR POSITIVE, UNSPECIFIED SITE OF BREAST: ICD-10-CM

## 2023-11-20 DIAGNOSIS — C50.911 MALIGNANT NEOPLASM OF RIGHT BREAST IN FEMALE, ESTROGEN RECEPTOR POSITIVE, UNSPECIFIED SITE OF BREAST: ICD-10-CM

## 2023-11-20 DIAGNOSIS — Z13.79 GENETIC TESTING: Primary | ICD-10-CM

## 2023-11-20 DIAGNOSIS — Z80.42 FAMILY HISTORY OF MALIGNANT NEOPLASM OF PROSTATE: ICD-10-CM

## 2023-11-21 NOTE — PROGRESS NOTES
Arely Huerta, a 68 y.o. female, was referred for genetic counseling due to a personal and family history of breast cancer, as well as to discuss results of recent genetic testing. Genetic counseling was provided via telemedicine at [facility]. Ms. Huerta was diagnosed with a right breast cancer at age 67, and underwent a lumpectomy and radiation treatment. She also has a personal history of basal cell carcinoma on her nasal tip. Ms. Huerta retains her uterus and ovaries. She reports having just a few polyps removed on colonoscopy. Ms. Huerta's oncologist ordered genetic testing given her personal and family history of breast cancer via the Common Hereditary Cancers panel at [facility], which evaluated BRCA1/2 and 45 additional genes associated with increased cancer risk. Genetic testing was negative for known pathogenic (harmful) mutations in BRCA1/2 and the 45 additional genes on this panel.  While no known pathogenic mutations were identified, a variant of uncertain significance (VUS) was identified in the GIULIANO gene. VUSs are differences in DNA that may or may not affect the function of the gene. VUSs are frequently reported with multigene panel testing, given the number of genes being evaluated and the presence of genetic variation in the population.  The classification of a variant to either a benign genetic variant or pathogenic mutation depends on a number of factors.  The majority (estimated to be >90%) of VUSs are eventually reclassified as benign gene variation. Clinical management should not be altered based on the identification of a VUS since these are not clinically actionable findings.  Ms. Huerta was interested in discussing the genetic testing that was already completed.      FAMILY HISTORY:  Son:  Brain tumor, 28 (unspecified type of brain tumor, reported to not be malignant)  Sister:  Breast cancer, 30s  Brother: Melanoma, 68  Mat. Aunt: Breast cancer, late 60s/early 70s  Father:  Prostate  cancer (metastatic to bone), 70s    We do not have medical records confirming the diagnoses in Ms. Huerta's family.    GENETIC COUNSELING (30 minutes):  We reviewed the family history information in detail.  Cases of cancer follow three general patterns: sporadic, familial, and hereditary.  While most cancer is sporadic, some cases appear to occur in family clusters.  These cases are said to be familial and account for 10-20% of certain cancer cases.  Familial cases may be due to a combination of shared genes and environmental factors among family members.  In even fewer families (~10%), the cancer is said to be inherited, and the genes responsible for the cancer are known.      Family histories typical of hereditary cancer syndromes usually include multiple first- and second-degree relatives diagnosed with cancer types that define a syndrome.  These cases tend to be diagnosed at younger-than-expected ages and can be bilateral or multifocal.  The cancer in these families follows an autosomal dominant inheritance pattern, which indicates the likely presence of a mutation in a cancer susceptibility gene.  Children and siblings of an individual believed to carry this mutation have a 50% chance of inheriting that mutation, thereby inheriting the increased risk to develop cancer.  These mutations can be passed down from the maternal or the paternal lineage.  Hereditary breast and ovarian cancer accounts for approximately 5-10% of all cases of breast and ovarian cancer.  A significant proportion can be attributed to mutations in the BRCA1 and BRCA2 genes. Mutations in these genes confer an increased risk for breast cancer, ovarian cancer, male breast cancer, prostate cancer and pancreatic cancer.  However, there are other genes in addition to BRCA1/2 known to contribute to cancer risk.  We discussed the panel testing that was already completed for Ms. Huerta, which involved testing for BRCA1/2, as well as 45 additional  genes that are associated with hereditary cancer risk. The testing was comprehensive and included sequencing and deletion/duplication testing of clinically significant high risk and moderate risk breast cancer associated genes, as well as genes associated with other cancer risks.    TEST RESULTS: Genetic testing was negative for known pathogenic (harmful) mutations in BRCA1 and BRCA2 as well as 45 additional genes included on this panel by sequencing and rearrangement testing.   A variant of uncertain significance (VUS) was reported in the GIULIANO gene.  A VUS is a finding that may or may not impact the function of the gene.  VUSs are not clinically actionable findings, and therefore this result does not impact management in any way at this time.  The majority of VUSs are ultimately reclassified as benign variation.  The identification of a VUS is common in multigene panel testing, given the number of genes being evaluated and the presence of genetic variation in the population.  If this variant is ever reclassified by the testing laboratory, a new report should be issued by the laboratory and released directly to the ordering physician.  This assessment is based on the information provided at the time of the consultation.    CLINICAL MANAGEMENT GUIDELINES: Ms. Sandhus surveillance and management should be determined by her oncology team. Despite the genetic test results that were negative for known pathogenic mutations, Ms. Huerta's female relatives may have a somewhat increased lifetime risk for breast cancer based on family history. Relatives may have a personalized risk assessment performed to quantify their breast cancer risk using a family history-based risk model, such as the Tyrer-Cuzick model. Surveillance for individuals with a high lifetime risk of breast cancer (>20%), based on NCCN guidelines, would consist of semi-annual clinical breast exams and monthly self-breast exams starting by age 18 and annual  mammography starting 10 years younger than the earliest diagnosis in the family, or age 40, whichever is earliest.  According to an American Cancer Society expert panel and NCCN guidelines, annual breast MRI should be offered to women whose lifetime risk of breast cancer is 20-25 percent or more, typically beginning at the same age as mammography.    PLAN: Genetic counseling remains available to MsJess Bradley. She is welcome to contact our office with any questions at [phone].      Cathy Hernandez MS, [facility] [place], [facility]  Licensed Certified Genetic Counselor      Cc: Arely Vazquez MD

## 2023-11-29 ENCOUNTER — HOSPITAL ENCOUNTER (OUTPATIENT)
Dept: OCCUPATIONAL THERAPY | Facility: HOSPITAL | Age: 68
Setting detail: THERAPIES SERIES
Discharge: HOME | End: 2023-11-29
Payer: COMMERCIAL

## 2023-11-29 DIAGNOSIS — Z91.89 AT RISK FOR LYMPHEDEMA: Primary | ICD-10-CM

## 2023-11-29 DIAGNOSIS — Z17.0 MALIGNANT NEOPLASM OF OVERLAPPING SITES OF RIGHT BREAST IN FEMALE, ESTROGEN RECEPTOR POSITIVE: ICD-10-CM

## 2023-11-29 DIAGNOSIS — Z17.0 MALIGNANT NEOPLASM OF UPPER-OUTER QUADRANT OF RIGHT BREAST IN FEMALE, ESTROGEN RECEPTOR POSITIVE: ICD-10-CM

## 2023-11-29 DIAGNOSIS — C50.811 MALIGNANT NEOPLASM OF OVERLAPPING SITES OF RIGHT BREAST IN FEMALE, ESTROGEN RECEPTOR POSITIVE: ICD-10-CM

## 2023-11-29 DIAGNOSIS — C50.411 MALIGNANT NEOPLASM OF UPPER-OUTER QUADRANT OF RIGHT BREAST IN FEMALE, ESTROGEN RECEPTOR POSITIVE: ICD-10-CM

## 2023-11-29 PROCEDURE — 97535 SELF CARE MNGMENT TRAINING: CPT

## 2023-11-29 PROCEDURE — 93702 BIS XTRACELL FLUID ANALYSIS: CPT

## 2023-11-29 NOTE — THERAPY RE-EVALUATION
Outpatient Occupational Therapy Lymphedema Re-Evaluation   Jesu     Patient Name: Arely Huerta  : 1955  MRN: [id number]  Today's Date: 2023      Visit Date: 2023    Patient Active Problem List   Diagnosis    Malignant neoplasm of overlapping sites of right breast in female, estrogen receptor positive    Allergic rhinitis    Ankylosing spondylitis    Arthritis    Asthma    Benign essential HTN    Bladder disorder    Breast lump    COPD (chronic obstructive pulmonary disease)    High blood pressure    PVD (peripheral vascular disease)    High cholesterol    Hypothyroidism    Leukopenia    Mitral valve prolapse    Neurologic disorder    Primary hypercoagulable state    Rectal bleeding    Shortness of breath    Disproportion between native breast and reconstructed breast    Osteopenia of multiple sites        Past Medical History:   Diagnosis Date    Acid reflux     Allergies     Ankylosing spondylitis     Breast cancer     Hypercholesteremia     Hypertension     Skin cancer     Thyroid condition         Past Surgical History:   Procedure Laterality Date    APPENDECTOMY      BREAST BIOPSY Right 2023    Procedure: RIGHT BREAST LUMPECTOMY WITH NEEDLE LOCALIZATION;  Surgeon: Laura Goff MD;  [place] [facility] [place] [facility]  Service: General;  Laterality: Right;    BREAST RECONSTRUCTION Bilateral 2023    Procedure: Bilateral breast reconstruction, left breast reduction, right breast oncoplastic closure with mastopexy;  Surgeon: Caren Martins MD;  [place] [facility] [place] [facility] [place]  Service: Plastics;  Laterality: Bilateral;    CORNEAL TRANSPLANT Right     FOOT SURGERY      SINUS SURGERY      x6    TUBAL ABDOMINAL LIGATION           Visit Dx:     ICD-10-CM ICD-9-CM   1. At risk for lymphedema  Z91.89 V49.89   2. Malignant neoplasm of upper-outer quadrant of right breast in female, estrogen receptor positive  C50.411 174.4    Z17.0 V86.0   3. Malignant neoplasm of overlapping sites of  right breast in female, estrogen receptor positive  C50.811 174.8    Z17.0 V86.0        Patient History       Row Name 11/29/23 1000             History    Chief Complaint Other 1 (comment)  Lymphedema surveillance program  -SF      Brief Description of Current Complaint Arely is a 68 y.o. female with a diagnosis of invasive ductal carcinoma ER/MD + HER2- of the right breast.  Pt will underwent a lumpectomy on 6/20/23.  -SF         Fall Risk Assessment    Any falls in the past year: No  -SF      Does patient have a fear of falling No  -SF         Services    Are you currently receiving Home Health services No  -SF      Do you plan to receive Home Health services in the near future No  -SF         Daily Activities    Primary Language English  -SF      Are you able to read Yes  -SF      Are you able to write Yes  -SF      How does patient learn best? Listening;Reading;Demonstration;Pictures/Video  -SF      Pt Participated in POC and Goals Yes  -SF         Safety    Are you being hurt, hit, or frightened by anyone at home or in your life? No  -SF      Are you being neglected by a caregiver No  -SF      Have you had any of the following issues with N/A  -SF                User Key  (r) = Recorded By, (t) = Taken By, (c) = Cosigned By      Initials Name Provider Type    SF Rosa Mi OT Occupational Therapist                     Lymphedema       Row Name 11/29/23 1000             Subjective Pain    Able to rate subjective pain? yes  -SF      Pre-Treatment Pain Level 5  -SF      Post-Treatment Pain Level 5  -SF         Subjective    Subjective Comments Patient reports she feels puffy. Patient reports she did HEP and massage daily.  -SF         Lymphedema Assessment    Lymphedema Classification RUE:;at risk/stage 0  -SF      Lymphedema Cancer Related Sx right;lumpectomy;simple mastectomy  -SF      Lymphedema Surgery Comments 06/20/2023  -SF      Lymph Nodes Removed # 2  -SF      Positive Lymph Nodes # 0  -SF      Chemo  "Received no  -SF      Radiation Therapy Received yes  -SF      Radiation Treatments #/Timeframe 25  -SF         LLIS - Physical Concerns    The amount of pain associated with my lymphedema is: 0  -SF      The amount of limb heaviness associated with my lymphedema is: 0  -SF      The amount of skin tightness associated with my lymphedema is: 0  -SF      The size of my swollen limb(s) seems: 0  -SF      Lymphedema affects the movement of my swollen limb(s): 0  -SF      The strength in my swollen limb(s) is: 0  -SF         LLIS - Psychosocial Concerns    Lymphedema affects my body image (i.e., \"how I think I look\"). 0  -SF      Lymphedema affects my socializing with others. 0  -SF      Lymphedema affects my intimate relations with spouse or partner (rate 0 if not applicable 0  -SF      Lymphedema \"gets me down\" (i.e., depression, frustration, or anger) 0  -SF      I must rely on others for help due to my lymphedema. 0  -SF      I know what to do to manage my lymphedema 2  -SF         LLIS - Functional Concerns    Lymphedema affects my ability to perform self-care activities (i.e. eating, dressing, hygiene) 0  -SF      Lymphedema affects my ability to perform routine home or work-related activities. 0  -SF      Lymphedema affects my performance of preferred leisure activities. 0  -SF      Lymphedema affects proper fit of clothing/shoes 0  -SF      Lymphedema affects my sleep 0  -SF         Posture/Observations    Posture- WNL Posture is WNL  -SF         General ROM    GENERAL ROM COMMENTS BUE WFL  -SF         MMT (Manual Muscle Testing)    General MMT Comments BUE WFL  -SF         Lymphedema Measurements    Measurement Type(s) Volumetric  -SF      Volumetric Areas Upper extremities  -SF         Compression/Skin Care    Compression/Skin Care compression garment  -SF      Compression/Skin Care Comments [place] [place] compression sleeve w/ silicone band - size I - 20-30mmHg - color sand  -SF         L-Dex Bioimpedence " Screening    L-Dex Measurement Extremity RUE  -SF      L-Dex Patient Position Standing  -SF      L-Dex UE Dominate Side Left  -SF      L-Dex UE At Risk Side Right  -SF      L-Dex UE Pre Surgical Value Yes  -SF      L-Dex UE Score 6.1  -SF      L-Dex UE Baseline Score -0.9  -SF      L-Dex UE Value Change 7  -SF      $ L-Dex Charge yes  -SF         Lymphedema Life Impact Scale Totals    A.  Total Q1 - Q17 (Do not include Q18) 2  -SF      B.  Total number of questions answered (Q1-Q17) 17  -SF      C. Divide A by B 0.12  -SF      D. Multiple C by 25 3  -SF                User Key  (r) = Recorded By, (t) = Taken By, (c) = Cosigned By      Initials Name Provider Type    Rosa Azevedo OT Occupational Therapist                            Therapy Education  Education Details: Therapist educating patient on wear schedule as well as donning compression sleeve. Patient was encouraged to continue HEP and self MLD daily.  Given: HEP, Symptoms/condition management, Edema management  Program: New, Reinforced  How Provided: Verbal, Demonstration  Provided to: Patient  Level of Understanding: Teach back education performed, Verbalized  20458 - OT Self Care/Mgmt Minutes: 25         OT Goals       Row Name 11/29/23 1217          Time Calculation    OT Goal Re-Cert Due Date 12/29/23  -               User Key  (r) = Recorded By, (t) = Taken By, (c) = Cosigned By      Initials Name Provider Type    Rosa Azevedo OT Occupational Therapist                  GOALS:  1. Post Breast Surgery Care/at risk for Lymphedema  LTG 1: 90 days:  As an indicator of no exacerbation of lymphedema staging, the patient will present with an L-Dex score less than [10] points from preoperative baseline.              STATUS: on going  STG 1a:   30 days: To prevent exacerbation of mixed edema to lymphedema, patient will utilize the 2 postsurgical compression garments daily.                 STATUS: on going  STG 1b: 30 days: Patient will be independent with  self-manual lymphatic massage.               STATUS: on going  STG 1c: 30 days:  Patient will be independent with identification of signs and symptoms of lymphedema exasperation per stoplight to recovery education handout.              STATUS: on going  STG 1 d: 30 days: Patient will be independent with HEP to prevent advancement in lymphedema staging.              STATUS: on going  TREATMENT:  Self Care/ADL retraining, Therapeutic Activity, Neuromuscular Re-education, Therapeutic Exercise, Bioimpedence Fluid Analysis, Post-Surgical compression garement [address] [facility]-High/ Carla Camisole Kit 2860K, Orthotic Management and training,  and Manual Therapy.     OT Assessment/Plan       Row Name 11/29/23 1209          OT Assessment    Functional Limitations Limitations in functional capacity and performance  -SF     Impairments Impaired lymphatic circulation  -SF     Assessment Comments Darin is a 68 year old female w/ dx of invasive ductal carcinoma ER/TX+ HER2- of the R breast. Patient underwent a lumpectomy on 6/20/2023. Patient continues to demonstrate an increase in L-dex measurements. Patient was given a 30 day wear schedule. Patient will continue to benefit from skilled occupational therapy to further evaluate ongoing lymphatic functioning to prevent advancement in lymphedema staging.  -SF     OT Diagnosis at risk for lymphedema  -SF     OT Rehab Potential Good  -SF     Patient/caregiver participated in establishment of treatment plan and goals Yes  -SF     Patient would benefit from skilled therapy intervention Yes  -SF        OT Plan    OT Frequency Other (comment)  -SF     Predicted Duration of Therapy Intervention (OT) 1 month follow up  -SF     Planned CPT's? OT EVAL LOW COMPLEXITY: 61218;OT RE-EVAL: 22992;OT THER ACT EA 15 MIN: [id number];OT SELF CARE/MGMT/TRAIN 15 MIN: 84220;OT MANUAL THERAPY EA 15 MIN: 92772;OT BIS XTRACELL FLUID ANALYSIS: 50192;OT CARE PLAN EA 15 MIN;OT ORTHOTIC MGMT/TRAIN EA 15 MIN:  35509;OT ORTHO/PROSTHET CHECKOUT EA 15 MIN: 27352  -     Planned Therapy Interventions (Optional Details) home exercise program;manual therapy techniques;stretching;strengthening;ROM (Range of Motion);prosthetic fitting/training;patient/family education;orthotic fitting/training  -SF     OT Plan Comments Continue POC  -SF               User Key  (r) = Recorded By, (t) = Taken By, (c) = Cosigned By      Initials Name Provider Type     Rosa Mi OT Occupational Therapist                              Time Calculation:   Timed Charges  80759 - OT Self Care/Mgmt Minutes: 25  Total Minutes  Timed Charges Total Minutes: 25   Total Minutes: 25     Therapy Charges for Today       Code Description Service Date Service Provider Modifiers Qty    86302829605 HC PT BIS XTRACELL FLUID ANALYSIS 11/29/2023 Rosa Mi OT  1    [id number]  OT SELF CARE/MGMT/TRAIN EA 15 MIN 11/29/2023 Rosa Mi OT GO 2                      Rosa Mi OT  11/29/2023

## 2023-12-11 NOTE — PROGRESS NOTES
Chief Complaint:    Bilateral breast recontrustion        History of Present Illness  Arely Huerta is a 68 y.o. female who presents to [facility] as a post operative follow up for  Bilateral breast reconstruction, left breast reduction, right breast oncoplastic closure with mastopexy 6/20/23.    She is 6m post op. Doing ok.  Has some pain in her right axilla area that comes and goes.  Radiation completed end 9/2023      Subjective  Right breast pain, usually with certain movements     Penicillins, Atorvastatin, Doxycycline, Lisinopril, Simvastatin, and Sulfa antibiotics  Allergies Reconciled.    Review of Systems   Constitutional: Negative.    HENT: Negative.     Eyes: Negative.    Respiratory: Negative.     Cardiovascular: Negative.    Gastrointestinal: Negative.    Endocrine: Negative.    Genitourinary: Negative.    Musculoskeletal:  Positive for myalgias.   Skin: Negative.    Allergic/Immunologic: Negative.    Neurological: Negative.    Hematological: Negative.    Psychiatric/Behavioral: Negative.        All review of system has been reviewed and it  is negative except the ones note above.     Objective     Vitals:    12/21/23 1124   BP: 171/89   Pulse: 83   Temp: 98 °F (36.7 °C)   SpO2: 94%       Body mass index is 24.99 kg/m².    Physical Exam   Vitals reviewed.  Constitutional  She appears well-developed and well-nourished.     Eyes  System normal.       Cardiovascular: Normal rate.     Pulmonary/Chest  Effort normal.     Skin  Skin is warm and dry.     Psychiatric  She has a normal mood and affect.     Breast    Additional breast conditions include the following:   Right Breast: Her right breast has a anchor scar.   Left Breast: Her left breast has a anchor scar.         Breast: Incision C/D/I no signs of infection. right breast smaller than left, nipples viable but pale. Right axilla small deficit noted, tender with palpation, scar tissue noted. Right  breast had 25 XRT treatments.                                                           Result Review :         No new results to review this visit     Assessment and Plan      Diagnoses and all orders for this visit:    1. Disproportion between native breast and reconstructed breast (Primary)    2. Breast pain, right            Additional Order(s):       Plan:   Continue with Lymphedema treatments, compression sleeve right arm. Discussed monitoring right breast discomfort and numbers from increased lymphedema. Continue massage.  Discussed fat grafting to right breast and reduction of left breast to make more symmetrical and improve shape of right breast/axilla. She is happy with her results and feels like the right breast discomfort is not severe enough to have another procedure at this time.   Suggested f/u 6 months for 1 year f/u, she agreed   patient instructed to call office for any questions or concerns and verbalized understanding of all instructions given.      Patient was given instructions and counseling regarding her condition. Please see specific information pulled into the AVS if appropriate.     Amanda Riggins, APRN  12/21/2023

## 2023-12-21 ENCOUNTER — OFFICE VISIT (OUTPATIENT)
Dept: PLASTIC SURGERY | Facility: CLINIC | Age: 68
End: 2023-12-21
Payer: MEDICARE

## 2023-12-21 VITALS
HEART RATE: 83 BPM | SYSTOLIC BLOOD PRESSURE: 171 MMHG | DIASTOLIC BLOOD PRESSURE: 89 MMHG | WEIGHT: 134.4 LBS | BODY MASS INDEX: 25.37 KG/M2 | OXYGEN SATURATION: 94 % | HEIGHT: 61 IN | TEMPERATURE: 98 F

## 2023-12-21 DIAGNOSIS — N64.4 BREAST PAIN, RIGHT: ICD-10-CM

## 2023-12-21 DIAGNOSIS — N65.1 DISPROPORTION BETWEEN NATIVE BREAST AND RECONSTRUCTED BREAST: Primary | ICD-10-CM

## 2023-12-27 ENCOUNTER — PATIENT MESSAGE (OUTPATIENT)
Dept: PLASTIC SURGERY | Facility: CLINIC | Age: 68
End: 2023-12-27
Payer: COMMERCIAL

## 2023-12-27 ENCOUNTER — PATIENT ROUNDING (BHMG ONLY) (OUTPATIENT)
Dept: PLASTIC SURGERY | Facility: CLINIC | Age: 68
End: 2023-12-27
Payer: COMMERCIAL

## 2023-12-27 NOTE — PROGRESS NOTES
[employer] message has been sent to the patient for [place] [facility]

## 2024-01-17 ENCOUNTER — HOSPITAL ENCOUNTER (OUTPATIENT)
Dept: OCCUPATIONAL THERAPY | Facility: HOSPITAL | Age: 69
Setting detail: THERAPIES SERIES
Discharge: HOME | End: 2024-01-17
Payer: MEDICARE

## 2024-01-17 DIAGNOSIS — Z17.0 MALIGNANT NEOPLASM OF UPPER-OUTER QUADRANT OF RIGHT BREAST IN FEMALE, ESTROGEN RECEPTOR POSITIVE: ICD-10-CM

## 2024-01-17 DIAGNOSIS — Z17.0 MALIGNANT NEOPLASM OF OVERLAPPING SITES OF RIGHT BREAST IN FEMALE, ESTROGEN RECEPTOR POSITIVE: ICD-10-CM

## 2024-01-17 DIAGNOSIS — C50.811 MALIGNANT NEOPLASM OF OVERLAPPING SITES OF RIGHT BREAST IN FEMALE, ESTROGEN RECEPTOR POSITIVE: ICD-10-CM

## 2024-01-17 DIAGNOSIS — Z91.89 AT RISK FOR LYMPHEDEMA: Primary | ICD-10-CM

## 2024-01-17 DIAGNOSIS — C50.411 MALIGNANT NEOPLASM OF UPPER-OUTER QUADRANT OF RIGHT BREAST IN FEMALE, ESTROGEN RECEPTOR POSITIVE: ICD-10-CM

## 2024-01-17 PROCEDURE — 93702 BIS XTRACELL FLUID ANALYSIS: CPT

## 2024-01-17 PROCEDURE — 97140 MANUAL THERAPY 1/> REGIONS: CPT

## 2024-01-17 NOTE — THERAPY RE-EVALUATION
Outpatient Occupational Therapy Lymphedema Re-Evaluation   Jesu     Patient Name: Arely Huerta  : 1955  MRN: [id number]  Today's Date: 2024      Visit Date: 2024    Patient Active Problem List   Diagnosis    Malignant neoplasm of overlapping sites of right breast in female, estrogen receptor positive    Allergic rhinitis    Ankylosing spondylitis    Arthritis    Asthma    Benign essential HTN    Bladder disorder    Breast lump    COPD (chronic obstructive pulmonary disease)    High blood pressure    PVD (peripheral vascular disease)    High cholesterol    Hypothyroidism    Leukopenia    Mitral valve prolapse    Neurologic disorder    Primary hypercoagulable state    Rectal bleeding    Shortness of breath    Disproportion between native breast and reconstructed breast    Osteopenia of multiple sites    Breast pain, right        Past Medical History:   Diagnosis Date    Acid reflux     Allergies     Ankylosing spondylitis     Breast cancer     Hypercholesteremia     Hypertension     Skin cancer     Thyroid condition         Past Surgical History:   Procedure Laterality Date    APPENDECTOMY      BREAST BIOPSY Right 2023    Procedure: RIGHT BREAST LUMPECTOMY WITH NEEDLE LOCALIZATION;  Surgeon: Laura Goff MD;  [place] [facility] [place] [facility] [place]  Service: General;  Laterality: Right;    BREAST RECONSTRUCTION Bilateral 2023    Procedure: Bilateral breast reconstruction, left breast reduction, right breast oncoplastic closure with mastopexy;  Surgeon: Caren Martins MD;  [place] [facility] [place] [facility] [place]  Service: Plastics;  Laterality: Bilateral;    CORNEAL TRANSPLANT Right     FOOT SURGERY      SINUS SURGERY      x6    TUBAL ABDOMINAL LIGATION           Visit Dx:     ICD-10-CM ICD-9-CM   1. At risk for lymphedema  Z91.89 V49.89   2. Malignant neoplasm of upper-outer quadrant of right breast in female, estrogen receptor positive  C50.411 174.4    Z17.0 V86.0   3. Malignant neoplasm of  overlapping sites of right breast in female, estrogen receptor positive  C50.811 174.8    Z17.0 V86.0        Patient History       Row Name 01/17/24 1100             History    Chief Complaint Other 1 (comment)  Lymphedema Surveillance Program  -SF      Brief Description of Current Complaint Arely is a 68 y.o. female with a diagnosis of invasive ductal carcinoma ER/SD + HER2- of the right breast.  Pt underwent a lumpectomy on 6/20/23.  -SF         Fall Risk Assessment    Any falls in the past year: No  -SF      Does patient have a fear of falling No  -SF         Services    Are you currently receiving Home Health services No  -SF      Do you plan to receive Home Health services in the near future No  -SF         Daily Activities    Primary Language English  -SF      Are you able to read Yes  -SF      Are you able to write Yes  -SF      How does patient learn best? Listening;Reading;Demonstration;Pictures/Video  -SF      Pt Participated in POC and Goals Yes  -SF         Safety    Are you being hurt, hit, or frightened by anyone at home or in your life? No  -SF      Are you being neglected by a caregiver No  -SF      Have you had any of the following issues with N/A  -SF                User Key  (r) = Recorded By, (t) = Taken By, (c) = Cosigned By      Initials Name Provider Type    SF Rosa Mi OT Occupational Therapist                     Lymphedema       Row Name 01/17/24 1100             Subjective Pain    Able to rate subjective pain? yes  -SF      Pre-Treatment Pain Level 0  -SF      Post-Treatment Pain Level 0  -SF         Subjective    Subjective Comments Patient reports some discomfort in the back of her arm and has started wearing her compression sleeve.  -SF         Lymphedema Assessment    Lymphedema Classification RUE:;at risk/stage 0  -SF      Lymphedema Cancer Related Sx right;lumpectomy;simple mastectomy  -SF      Lymphedema Surgery Comments 6/20/2023  -SF      Lymph Nodes Removed # 2  -SF       "Positive Lymph Nodes # 0  -SF      Chemo Received no  -SF      Radiation Therapy Received yes  -SF      Radiation Treatments #/Timeframe 25  -SF         LLIS - Physical Concerns    The amount of pain associated with my lymphedema is: 0  -SF      The amount of limb heaviness associated with my lymphedema is: 0  -SF      The amount of skin tightness associated with my lymphedema is: 0  -SF      The size of my swollen limb(s) seems: 0  -SF      Lymphedema affects the movement of my swollen limb(s): 0  -SF      The strength in my swollen limb(s) is: 0  -SF         LLIS - Psychosocial Concerns    Lymphedema affects my body image (i.e., \"how I think I look\"). 0  -SF      Lymphedema affects my socializing with others. 0  -SF      Lymphedema affects my intimate relations with spouse or partner (rate 0 if not applicable 0  -SF      Lymphedema \"gets me down\" (i.e., depression, frustration, or anger) 0  -SF      I must rely on others for help due to my lymphedema. 0  -SF      I know what to do to manage my lymphedema 2  -SF         LLIS - Functional Concerns    Lymphedema affects my ability to perform self-care activities (i.e. eating, dressing, hygiene) 0  -SF      Lymphedema affects my ability to perform routine home or work-related activities. 0  -SF      Lymphedema affects my performance of preferred leisure activities. 0  -SF      Lymphedema affects proper fit of clothing/shoes 0  -SF      Lymphedema affects my sleep 0  -SF         Posture/Observations    Posture- WNL Posture is WNL  -SF         General ROM    GENERAL ROM COMMENTS BUE WFL  -SF         MMT (Manual Muscle Testing)    General MMT Comments BUE WFL  -SF         Lymphedema Measurements    Measurement Type(s) Volumetric  -SF      Volumetric Areas Upper extremities  -SF         Manual Lymphatic Drainage    Manual Lymphatic Drainage initial sequence;extremity treatment;opened anastamoses;opened regional lymph nodes  -SF      Initial Sequence short " neck;cervical;supraclavicular;shoulder collectors;diaphragmatic breathing  -SF      Opened Regional Lymph Nodes axillary;inguinal  parasternal  -SF      Axillary right;left  -SF      Inguinal right  -SF      Opened Anastamoses anterior axillo-axillary;posterior axillo-axillary;axillo-inguinal  -SF      Axillo-Inguinal right  -SF      Extremity Treatment MLD to full limb  -SF      Manual Lymphatic Drainage Comments Patient tolerated treatment well  -SF         L-Dex Bioimpedence Screening    L-Dex Measurement Extremity RUE  -SF      L-Dex Patient Position Standing  -SF      L-Dex UE Dominate Side Left  -SF      L-Dex UE At Risk Side Right  -SF      L-Dex UE Pre Surgical Value Yes  -SF      L-Dex UE Score 9  -SF      L-Dex UE Baseline Score -0.9  -SF      L-Dex UE Value Change 9.9  -SF      $ L-Dex Charge yes  -SF         Lymphedema Life Impact Scale Totals    A.  Total Q1 - Q17 (Do not include Q18) 2  -SF      B.  Total number of questions answered (Q1-Q17) 17  -SF      C. Divide A by B 0.12  -SF      D. Multiple C by 25 3  -SF                User Key  (r) = Recorded By, (t) = Taken By, (c) = Cosigned By      Initials Name Provider Type    Rosa Azevedo OT Occupational Therapist                            Therapy Education  Education Details: Patient was educated to utilize compression sleeve and compression bra daily. Patient was also educated on scar massage to address scar tissue in R breast.  Given: HEP, Symptoms/condition management, Edema management  Program: Reinforced, New  How Provided: Verbal, Demonstration  Provided to: Patient  Level of Understanding: Teach back education performed, Verbalized, Demonstrated      Manual Rx (last 36 hours)       Manual Treatments       Row Name 01/17/24 1429             Total Minutes    91047 - OT Manual Therapy Minutes 30  -SF                User Key  (r) = Recorded By, (t) = Taken By, (c) = Cosigned By      Initials Name Provider Type    Rosa Azevedo OT Occupational  Therapist                   OT Goals       Row Name 01/17/24 1429          Time Calculation    OT Goal Re-Cert Due Date 02/16/24  -SF               User Key  (r) = Recorded By, (t) = Taken By, (c) = Cosigned By      Initials Name Provider Type    Rosa Azevedo OT Occupational Therapist                  GOALS:  1. Post Breast Surgery Care/at risk for Lymphedema  LTG 1: 90 days:  As an indicator of no exacerbation of lymphedema staging, the patient will present with an L-Dex score less than [10] points from preoperative baseline.              STATUS: on going  STG 1a:   30 days: To prevent exacerbation of mixed edema to lymphedema, patient will utilize the 2 postsurgical compression garments daily.                 STATUS: on going  STG 1b: 30 days: Patient will be independent with self-manual lymphatic massage.               STATUS: on going  STG 1c: 30 days:  Patient will be independent with identification of signs and symptoms of lymphedema exasperation per stoplight to recovery education handout.              STATUS: on going  STG 1 d: 30 days: Patient will be independent with HEP to prevent advancement in lymphedema staging.              STATUS: on going  TREATMENT:  Self Care/ADL retraining, Therapeutic Activity, Neuromuscular Re-education, Therapeutic Exercise, Bioimpedence Fluid Analysis, Post-Surgical compression garement [address] [employer] Zip-ST-High/ Carla Camisole Kit 2860K, Orthotic Management and training,  and Manual Therapy.     OT Assessment/Plan       Row Name 01/17/24 1427          OT Assessment    Functional Limitations Limitations in functional capacity and performance  -SF     Impairments Impaired lymphatic circulation  -SF     Assessment Comments Patient is a 68 year old female w/ dx of invasive ductal carcinoma ER/TX + HER2- of the R breast. Patient underwent a lumpectomy on 6/20/2023.  Patient continues to demonstrate an increase in L-Dex scores.  Therapist recommending ongoing treatment at this  time to address lymphedema exacerbation.  Patient will continue to benefit from skilled occupational therapy to further evaluate ongoing lymphatic functioning to prevent advancement in lymphedema staging, decreased range of motion and increased pain  -SF     OT Diagnosis At risk for lymphedema  -SF     OT Rehab Potential Good  -SF     Patient/caregiver participated in establishment of treatment plan and goals Yes  -SF     Patient would benefit from skilled therapy intervention Yes  -SF        OT Plan    OT Frequency 2x/week  -SF     Predicted Duration of Therapy Intervention (OT) 4 weeks  -SF     Planned CPT's? OT EVAL LOW COMPLEXITY: 56890;OT RE-EVAL: 73789;OT THER ACT EA 15 MIN: [id number];OT SELF CARE/MGMT/TRAIN 15 MIN: 61199;OT MANUAL THERAPY EA 15 MIN: 89800;OT BIS XTRACELL FLUID ANALYSIS: 44692;OT CARE PLAN EA 15 MIN;OT ORTHOTIC MGMT/TRAIN EA 15 MIN: 96056;OT ORTHO/PROSTHET CHECKOUT EA 15 MIN: 67627  -SF     Planned Therapy Interventions (Optional Details) home exercise program;manual therapy techniques;stretching;strengthening;ROM (Range of Motion);prosthetic fitting/training;patient/family education;orthotic fitting/training  -SF     OT Plan Comments Continue POC  -SF               User Key  (r) = Recorded By, (t) = Taken By, (c) = Cosigned By      Initials Name Provider Type    SF Rosa Mi OT Occupational Therapist                              Time Calculation:   Timed Charges  70935 - OT Manual Therapy Minutes: 30  Total Minutes  Timed Charges Total Minutes: 30   Total Minutes: 30     Therapy Charges for Today       Code Description Service Date Service Provider Modifiers Qty    99264680068 HC PT BIS XTRACELL FLUID ANALYSIS 1/17/2024 Rosa Mi OT  1    68574367146 HC OT MANUAL THERAPY EA 15 MIN 1/17/2024 Rosa Mi OT GO 2                      Rosa Mi OT  1/17/2024

## 2024-01-25 ENCOUNTER — APPOINTMENT (OUTPATIENT)
Dept: OCCUPATIONAL THERAPY | Facility: HOSPITAL | Age: 69
End: 2024-01-25
Payer: MEDICARE

## 2024-01-31 ENCOUNTER — HOSPITAL ENCOUNTER (OUTPATIENT)
Dept: OCCUPATIONAL THERAPY | Facility: HOSPITAL | Age: 69
Setting detail: THERAPIES SERIES
Discharge: HOME | End: 2024-01-31
Payer: MEDICARE

## 2024-01-31 DIAGNOSIS — Z17.0 MALIGNANT NEOPLASM OF UPPER-OUTER QUADRANT OF RIGHT BREAST IN FEMALE, ESTROGEN RECEPTOR POSITIVE: ICD-10-CM

## 2024-01-31 DIAGNOSIS — C50.811 MALIGNANT NEOPLASM OF OVERLAPPING SITES OF RIGHT BREAST IN FEMALE, ESTROGEN RECEPTOR POSITIVE: ICD-10-CM

## 2024-01-31 DIAGNOSIS — C50.411 MALIGNANT NEOPLASM OF UPPER-OUTER QUADRANT OF RIGHT BREAST IN FEMALE, ESTROGEN RECEPTOR POSITIVE: ICD-10-CM

## 2024-01-31 DIAGNOSIS — Z17.0 MALIGNANT NEOPLASM OF OVERLAPPING SITES OF RIGHT BREAST IN FEMALE, ESTROGEN RECEPTOR POSITIVE: ICD-10-CM

## 2024-01-31 DIAGNOSIS — Z91.89 AT RISK FOR LYMPHEDEMA: Primary | ICD-10-CM

## 2024-01-31 PROCEDURE — 97140 MANUAL THERAPY 1/> REGIONS: CPT

## 2024-01-31 PROCEDURE — 93702 BIS XTRACELL FLUID ANALYSIS: CPT

## 2024-01-31 NOTE — THERAPY RE-EVALUATION
Outpatient Occupational Therapy Lymphedema Re-Evaluation   Jesu     Patient Name: Arely Huerta  : 1955  MRN: [id number]  Today's Date: 2024      Visit Date: 2024    Patient Active Problem List   Diagnosis    Malignant neoplasm of overlapping sites of right breast in female, estrogen receptor positive    Allergic rhinitis    Ankylosing spondylitis    Arthritis    Asthma    Benign essential HTN    Bladder disorder    Breast lump    COPD (chronic obstructive pulmonary disease)    High blood pressure    PVD (peripheral vascular disease)    High cholesterol    Hypothyroidism    Leukopenia    Mitral valve prolapse    Neurologic disorder    Primary hypercoagulable state    Rectal bleeding    Shortness of breath    Disproportion between native breast and reconstructed breast    Osteopenia of multiple sites    Breast pain, right        Past Medical History:   Diagnosis Date    Acid reflux     Allergies     Ankylosing spondylitis     Breast cancer     Hypercholesteremia     Hypertension     Skin cancer     Thyroid condition         Past Surgical History:   Procedure Laterality Date    APPENDECTOMY      BREAST BIOPSY Right 2023    Procedure: RIGHT BREAST LUMPECTOMY WITH NEEDLE LOCALIZATION;  Surgeon: Laura Goff MD;  [place]: [facility] [place] [facility] [place];  Service: General;  Laterality: Right;    BREAST RECONSTRUCTION Bilateral 2023    Procedure: Bilateral breast reconstruction, left breast reduction, right breast oncoplastic closure with mastopexy;  Surgeon: Caren Martins MD;  Location: [facility] [place] [facility] [place];  Service: Plastics;  Laterality: Bilateral;    CORNEAL TRANSPLANT Right     FOOT SURGERY      SINUS SURGERY      x6    TUBAL ABDOMINAL LIGATION           Visit Dx:     ICD-10-CM ICD-9-CM   1. At risk for lymphedema  Z91.89 V49.89   2. Malignant neoplasm of upper-outer quadrant of right breast in female, estrogen receptor positive  C50.411 174.4    Z17.0 V86.0   3. Malignant neoplasm of  overlapping sites of right breast in female, estrogen receptor positive  C50.811 174.8    Z17.0 V86.0            Lymphedema       Row Name 01/31/24 1200             Subjective Pain    Able to rate subjective pain? yes  -SF      Pre-Treatment Pain Level 1  -SF      Post-Treatment Pain Level 1  -SF         Subjective    Subjective Comments Patient reports compliance w/ self MLD and wear schedule.  -SF         Lymphedema Assessment    Lymphedema Classification RUE:;at risk/stage 0  -SF      Lymphedema Cancer Related Sx right;lumpectomy;simple mastectomy  -SF      Lymphedema Surgery Comments 6/20/2023  -SF      Lymph Nodes Removed # 2  -SF      Positive Lymph Nodes # 0  -SF      Chemo Received no  -SF      Radiation Therapy Received yes  -SF      Radiation Treatments #/Timeframe 25  -SF         Posture/Observations    Posture- WNL Posture is WNL  -SF         Lymphedema Measurements    Measurement Type(s) Volumetric  -SF      Volumetric Areas Upper extremities  -SF         Manual Lymphatic Drainage    Manual Lymphatic Drainage initial sequence;extremity treatment;opened anastamoses;opened regional lymph nodes  -SF      Initial Sequence short neck;cervical;supraclavicular;shoulder collectors;diaphragmatic breathing  -SF      Opened Regional Lymph Nodes axillary;inguinal;ribs  Parasternal  -SF      Axillary right;left  -SF      Inguinal right  -SF      Opened Anastamoses anterior axillo-axillary;posterior axillo-axillary;axillo-inguinal  -SF      Axillo-Inguinal right  -SF      Extremity Treatment MLD to full limb  -SF         L-Dex Bioimpedence Screening    L-Dex Measurement Extremity RUE  -SF      L-Dex Patient Position Standing  -SF      L-Dex UE Dominate Side Left  -SF      L-Dex UE At Risk Side Right  -SF      L-Dex UE Pre Surgical Value Yes  -SF      L-Dex UE Score 1.2  -SF      L-Dex UE Baseline Score -0.9  -SF      L-Dex UE Value Change 2.1  -SF      $ L-Dex Charge yes  -SF                User Key  (r) = Recorded  By, (t) = Taken By, (c) = Cosigned By      Initials Name Provider Type    Rosa Azevedo OT Occupational Therapist                            Therapy Education  Education Details: Patient was educated to discontinue use of compression bra daily but to continue wearing compression sleeve. Therapist educating patient that she may decrease to every other day wear schedule but to monitor her symptoms.  Given: Symptoms/condition management, Edema management  Program: New  How Provided: Verbal  Provided to: Caregiver  Level of Understanding: Verbalized      Manual Rx (last 36 hours)       Manual Treatments       Row Name 01/31/24 1332             Total Minutes    57148 - OT Manual Therapy Minutes 45  -SF                User Key  (r) = Recorded By, (t) = Taken By, (c) = Cosigned By      Initials Name Provider Type    Rosa Azevedo OT Occupational Therapist                   OT Goals       Row Name 01/31/24 1332          Time Calculation    OT Goal Re-Cert Due Date 03/01/24  -SF               User Key  (r) = Recorded By, (t) = Taken By, (c) = Cosigned By      Initials Name Provider Type    Rosa Azevedo OT Occupational Therapist                     OT Assessment/Plan       Row Name 01/31/24 1317          OT Assessment    Functional Limitations Limitations in functional capacity and performance  -SF     Impairments Impaired lymphatic circulation  -SF     Assessment Comments Patient is a 68-year-old female with diagnosis of invasive ductal carcinoma ER/NM + HER2 -of the right breast.  Patient underwent a lumpectomy 6/20/2023.  Patient is demonstrating normalized lymphatic functioning on this date.  Patient will continue to benefit from skilled occupational therapy to further evaluate ongoing lymphatic functioning to prevent advancement in lymphedema staging, decreased range of motion and increased pain.  -SF     OT Diagnosis At risk for lymphedema  -SF     OT Rehab Potential Good  -SF     Patient/caregiver participated  in establishment of treatment plan and goals Yes  -SF     Patient would benefit from skilled therapy intervention Yes  -SF        OT Plan    OT Frequency Other (comment)  -SF     Predicted Duration of Therapy Intervention (OT) 1 month follow up  -SF     Planned CPT's? OT EVAL LOW COMPLEXITY: 79872;OT RE-EVAL: 37636;OT THER ACT EA 15 MIN: 70856IW;OT SELF CARE/MGMT/TRAIN 15 MIN: 77189;OT MANUAL THERAPY EA 15 MIN: 80984;OT BIS XTRACELL FLUID ANALYSIS: 51535;OT CARE PLAN EA 15 MIN;OT ORTHOTIC MGMT/TRAIN EA 15 MIN: 70014;OT ORTHO/PROSTHET CHECKOUT EA 15 MIN: 64218  -SF     Planned Therapy Interventions (Optional Details) home exercise program;manual therapy techniques;stretching;strengthening;ROM (Range of Motion);prosthetic fitting/training;patient/family education;orthotic fitting/training  -SF     OT Plan Comments Continue POC  -SF               User Key  (r) = Recorded By, (t) = Taken By, (c) = Cosigned By      Initials Name Provider Type    SF Rosa Mi OT Occupational Therapist                              Time Calculation:   Timed Charges  78631 - OT Manual Therapy Minutes: 45  Total Minutes  Timed Charges Total Minutes: 45   Total Minutes: 45     Therapy Charges for Today       Code Description Service Date Service Provider Modifiers Qty    93014732834 HC PT BIS XTRACELL FLUID ANALYSIS 1/31/2024 Rosa Mi OT  1    01484969718 HC OT MANUAL THERAPY EA 15 MIN 1/31/2024 Rosa Mi OT GO 3                      Rosa Mi OT  1/31/2024

## 2024-02-02 ENCOUNTER — APPOINTMENT (OUTPATIENT)
Dept: OCCUPATIONAL THERAPY | Facility: HOSPITAL | Age: 69
End: 2024-02-02
Payer: COMMERCIAL

## 2024-02-07 ENCOUNTER — APPOINTMENT (OUTPATIENT)
Dept: OCCUPATIONAL THERAPY | Facility: HOSPITAL | Age: 69
End: 2024-02-07
Payer: COMMERCIAL

## 2024-02-09 ENCOUNTER — APPOINTMENT (OUTPATIENT)
Dept: OCCUPATIONAL THERAPY | Facility: HOSPITAL | Age: 69
End: 2024-02-09
Payer: COMMERCIAL

## 2024-02-13 RX ORDER — SERTRALINE HYDROCHLORIDE 25 MG/1
25 TABLET, FILM COATED ORAL DAILY
Qty: 30 TABLET | Refills: 0 | OUTPATIENT
Start: 2024-02-13

## 2024-02-23 RX ORDER — SERTRALINE HYDROCHLORIDE 25 MG/1
25 TABLET, FILM COATED ORAL DAILY
Qty: 30 TABLET | Refills: 0 | Status: SHIPPED | OUTPATIENT
Start: 2024-02-23

## 2024-02-29 ENCOUNTER — HOSPITAL ENCOUNTER (OUTPATIENT)
Dept: OCCUPATIONAL THERAPY | Facility: HOSPITAL | Age: 69
Setting detail: THERAPIES SERIES
Discharge: HOME | End: 2024-02-29
Payer: COMMERCIAL

## 2024-02-29 DIAGNOSIS — Z17.0 MALIGNANT NEOPLASM OF UPPER-OUTER QUADRANT OF RIGHT BREAST IN FEMALE, ESTROGEN RECEPTOR POSITIVE: ICD-10-CM

## 2024-02-29 DIAGNOSIS — Z17.0 MALIGNANT NEOPLASM OF OVERLAPPING SITES OF RIGHT BREAST IN FEMALE, ESTROGEN RECEPTOR POSITIVE: ICD-10-CM

## 2024-02-29 DIAGNOSIS — C50.411 MALIGNANT NEOPLASM OF UPPER-OUTER QUADRANT OF RIGHT BREAST IN FEMALE, ESTROGEN RECEPTOR POSITIVE: ICD-10-CM

## 2024-02-29 DIAGNOSIS — Z91.89 AT RISK FOR LYMPHEDEMA: Primary | ICD-10-CM

## 2024-02-29 DIAGNOSIS — C50.811 MALIGNANT NEOPLASM OF OVERLAPPING SITES OF RIGHT BREAST IN FEMALE, ESTROGEN RECEPTOR POSITIVE: ICD-10-CM

## 2024-02-29 PROCEDURE — 97535 SELF CARE MNGMENT TRAINING: CPT

## 2024-02-29 PROCEDURE — 93702 BIS XTRACELL FLUID ANALYSIS: CPT

## 2024-02-29 NOTE — THERAPY RE-EVALUATION
Outpatient Occupational Therapy Lymphedema Re-Evaluation   Jesu     Patient Name: Arely Huerta  : 1955  MRN: [id number]  Today's Date: 2024      Visit Date: 2024    Patient Active Problem List   Diagnosis    Malignant neoplasm of overlapping sites of right breast in female, estrogen receptor positive    Allergic rhinitis    Ankylosing spondylitis    Arthritis    Asthma    Benign essential HTN    Bladder disorder    Breast lump    COPD (chronic obstructive pulmonary disease)    High blood pressure    PVD (peripheral vascular disease)    High cholesterol    Hypothyroidism    Leukopenia    Mitral valve prolapse    Neurologic disorder    Primary hypercoagulable state    Rectal bleeding    Shortness of breath    Disproportion between native breast and reconstructed breast    Osteopenia of multiple sites    Breast pain, right        Past Medical History:   Diagnosis Date    Acid reflux     Allergies     Ankylosing spondylitis     Breast cancer     Hypercholesteremia     Hypertension     Skin cancer     Thyroid condition         Past Surgical History:   Procedure Laterality Date    APPENDECTOMY      BREAST BIOPSY Right 2023    Procedure: RIGHT BREAST LUMPECTOMY WITH NEEDLE LOCALIZATION;  Surgeon: Laura Goff MD;  [place] [facility] [place] [facility] [place]: [place];  Laterality: Right;    BREAST RECONSTRUCTION Bilateral 2023    Procedure: Bilateral breast reconstruction, left breast reduction, right breast oncoplastic closure with mastopexy;  Surgeon: Caren Martins MD;  [place] [facility] [place] [facility] [place]  Service: Plastics;  Laterality: Bilateral;    CORNEAL TRANSPLANT Right     FOOT SURGERY      SINUS SURGERY      x6    TUBAL ABDOMINAL LIGATION           Visit Dx:     ICD-10-CM ICD-9-CM   1. At risk for lymphedema  Z91.89 V49.89   2. Malignant neoplasm of upper-outer quadrant of right breast in female, estrogen receptor positive  C50.411 174.4    Z17.0 V86.0   3. Malignant neoplasm of  overlapping sites of right breast in female, estrogen receptor positive  C50.811 174.8    Z17.0 V86.0        Patient History       Row Name 02/29/24 1000             History    Chief Complaint Other 1 (comment)  Lymphedema surveillance program  -SF      Brief Description of Current Complaint Arely is a 68 y.o. female with a diagnosis of invasive ductal carcinoma ER/AR + HER2- of the right breast.  Pt underwent a lumpectomy on 6/20/23.  -SF         Fall Risk Assessment    Any falls in the past year: No  -SF      Does patient have a fear of falling No  -SF         Services    Are you currently receiving Home Health services No  -SF      Do you plan to receive Home Health services in the near future No  -SF         Daily Activities    Primary Language English  -SF      Are you able to read Yes  -SF      Are you able to write Yes  -SF      How does patient learn best? Listening;Reading;Demonstration;Pictures/Video  -SF      Pt Participated in POC and Goals Yes  -SF         Safety    Are you being hurt, hit, or frightened by anyone at home or in your life? No  -SF      Are you being neglected by a caregiver No  -SF      Have you had any of the following issues with N/A  -SF                User Key  (r) = Recorded By, (t) = Taken By, (c) = Cosigned By      Initials Name Provider Type    SF Rosa Mi OT Occupational Therapist                     Lymphedema       Row Name 02/29/24 1000             Subjective Pain    Able to rate subjective pain? yes  -SF      Pre-Treatment Pain Level 0  -SF      Post-Treatment Pain Level 0  -SF         Subjective    Subjective Comments Patient reports no concerns. Patient continues to report compliance w/ self MLD.  -SF         Lymphedema Assessment    Lymphedema Classification RUE:;at risk/stage 0  -SF      Lymphedema Cancer Related Sx right;lumpectomy;simple mastectomy  -SF      Lymphedema Surgery Comments 6/20/2023  -SF      Lymph Nodes Removed # 2  -SF      Positive Lymph Nodes # 0   "-SF      Chemo Received no  -SF      Radiation Therapy Received yes  -SF      Radiation Treatments #/Timeframe 25  -SF         LLIS - Physical Concerns    The amount of pain associated with my lymphedema is: 0  -SF      The amount of limb heaviness associated with my lymphedema is: 0  -SF      The amount of skin tightness associated with my lymphedema is: 0  -SF      The size of my swollen limb(s) seems: 0  -SF      Lymphedema affects the movement of my swollen limb(s): 0  -SF      The strength in my swollen limb(s) is: 0  -SF         LLIS - Psychosocial Concerns    Lymphedema affects my body image (i.e., \"how I think I look\"). 0  -SF      Lymphedema affects my socializing with others. 0  -SF      Lymphedema affects my intimate relations with spouse or partner (rate 0 if not applicable 0  -SF      Lymphedema \"gets me down\" (i.e., depression, frustration, or anger) 0  -SF      I must rely on others for help due to my lymphedema. 0  -SF      I know what to do to manage my lymphedema 2  -SF         LLIS - Functional Concerns    Lymphedema affects my ability to perform self-care activities (i.e. eating, dressing, hygiene) 0  -SF      Lymphedema affects my ability to perform routine home or work-related activities. 0  -SF      Lymphedema affects my performance of preferred leisure activities. 0  -SF      Lymphedema affects proper fit of clothing/shoes 0  -SF      Lymphedema affects my sleep 0  -SF         Posture/Observations    Posture- WNL Posture is WNL  -SF         General ROM    GENERAL ROM COMMENTS BUE WFL  -SF         MMT (Manual Muscle Testing)    General MMT Comments BUE WFL  -SF         Lymphedema Measurements    Measurement Type(s) Volumetric  -SF      Volumetric Areas Upper extremities  -SF         L-Dex Bioimpedence Screening    L-Dex Measurement Extremity RUE  -SF      L-Dex Patient Position Standing  -SF      L-Dex UE Dominate Side Left  -SF      L-Dex UE At Risk Side Right  -SF      L-Dex UE Pre Surgical " Value Yes  -SF      L-Dex UE Score 3.5  -SF      L-Dex UE Baseline Score -0.9  -SF      L-Dex UE Value Change 4.4  -SF      $ L-Dex Charge yes  -SF         Lymphedema Life Impact Scale Totals    A.  Total Q1 - Q17 (Do not include Q18) 2  -SF      B.  Total number of questions answered (Q1-Q17) 17  -SF      C. Divide A by B 0.12  -SF      D. Multiple C by 25 3  -SF                User Key  (r) = Recorded By, (t) = Taken By, (c) = Cosigned By      Initials Name Provider Type    Rosa Azevedo OT Occupational Therapist                            Therapy Education  Education Details: Therapist and patient discussed wear schedule moving forward w/ patient demonstrating normalized lymphatic functioning on this date. Patient agreeable to wearing compression sleeve during activity and when she notices symptoms and we will re-evaluate in 3 months. Therapist educating patient to contact lymphedema clinic if she experinces a worsening of symptoms. Therapist encouraging patient to continue with self MLD x2 daily and HEP.  Given: HEP, Symptoms/condition management, Edema management  Program: Reinforced, New  How Provided: Verbal  Provided to: Patient  Level of Understanding: Verbalized  71312 - OT Self Care/Mgmt Minutes: 25         OT Goals       Row Name 02/29/24 1156          Time Calculation    OT Goal Re-Cert Due Date 03/30/24  -               User Key  (r) = Recorded By, (t) = Taken By, (c) = Cosigned By      Initials Name Provider Type    Rosa Azevedo OT Occupational Therapist                  GOALS:  1. Post Breast Surgery Care/at risk for Lymphedema  LTG 1: 90 days:  As an indicator of no exacerbation of lymphedema staging, the patient will present with an L-Dex score less than [10] points from preoperative baseline.              STATUS: on going  STG 1a:   30 days: To prevent exacerbation of mixed edema to lymphedema, patient will utilize the 2 postsurgical compression garments daily.                 STATUS: on  going  STG 1b: 30 days: Patient will be independent with self-manual lymphatic massage.               STATUS: on going  STG 1c: 30 days:  Patient will be independent with identification of signs and symptoms of lymphedema exasperation per stoplight to recovery education handout.              STATUS: on going  STG 1 d: 30 days: Patient will be independent with HEP to prevent advancement in lymphedema staging.              STATUS: on going  TREATMENT:  Self Care/ADL retraining, Therapeutic Activity, Neuromuscular Re-education, Therapeutic Exercise, Bioimpedence Fluid Analysis, Post-Surgical compression garement [address] [employer]/ Carla Camisole Kit 2860K, Orthotic Management and training,  and Manual Therapy.     OT Assessment/Plan       Row Name 02/29/24 1155          OT Assessment    Functional Limitations Limitations in functional capacity and performance  -SF     Impairments Impaired lymphatic circulation  -SF     Assessment Comments Arely is a 68-year-old female with diagnosis of invasive ductal carcinoma ER/AZ+ HER2 -of the right breast.  Patient underwent a lumpectomy 6/20/2023.  Patient is demonstrating normalized lymphatic functioning on this date and subjectively reports no concerns.  Patient will continue to benefit from skilled occupational therapy to further evaluate ongoing lymphatic functioning to prevent advancement in lymphedema staging, decreased range of motion and increased pain  -SF     OT Diagnosis At risk for lymphedema  -SF     OT Rehab Potential Good  -SF     Patient/caregiver participated in establishment of treatment plan and goals Yes  -SF     Patient would benefit from skilled therapy intervention Yes  -SF        OT Plan    OT Frequency Other (comment)  -SF     Predicted Duration of Therapy Intervention (OT) Patient will be reevaluated every 3 months years 1 through 3 and every 6 months years 4 through 5  -SF     Planned CPT's? OT EVAL LOW COMPLEXITY: 41396;OT RE-EVAL: 91354;OT  THER ACT EA 15 MIN: 49437RA;OT SELF CARE/MGMT/TRAIN 15 MIN: 57195;OT MANUAL THERAPY EA 15 MIN: 37578;OT BIS XTRACELL FLUID ANALYSIS: 42659;OT CARE PLAN EA 15 MIN;OT ORTHOTIC MGMT/TRAIN EA 15 MIN: 52767;OT ORTHO/PROSTHET CHECKOUT EA 15 MIN: 11542  -SF     Planned Therapy Interventions (Optional Details) home exercise program;manual therapy techniques;stretching;strengthening;ROM (Range of Motion);prosthetic fitting/training;patient/family education;orthotic fitting/training  -SF     OT Plan Comments Continue POC  -SF               User Key  (r) = Recorded By, (t) = Taken By, (c) = Cosigned By      Initials Name Provider Type    SF Rosa Mi OT Occupational Therapist                              Time Calculation:   Timed Charges  36411 - OT Self Care/Mgmt Minutes: 25  Total Minutes  Timed Charges Total Minutes: 25   Total Minutes: 25     Therapy Charges for Today       Code Description Service Date Service Provider Modifiers Qty    86509738847 HC PT BIS XTRACELL FLUID ANALYSIS 2/29/2024 Rosa Mi OT  1    [id number]  OT SELF CARE/MGMT/TRAIN EA 15 MIN 2/29/2024 Rosa Mi OT GO 2                      Rosa Mi OT  2/29/2024

## 2024-03-08 ENCOUNTER — TELEPHONE (OUTPATIENT)
Dept: SURGERY | Facility: CLINIC | Age: 69
End: 2024-03-08
Payer: MEDICARE

## 2024-03-08 NOTE — TELEPHONE ENCOUNTER
Hub staff attempted to follow warm transfer process and was unsuccessful     Caller: Arely Huerta A     Relationship to patient: SELF     Best call back number: [phone]     Patient is needing: PATIENT RECEIVED A LETTER SAYING THAT SHE WAS APPROVED FOR PHYSICAL THERAPY WITH DR CAMARILLO NAME ON IT, PATIENT WASN'T AWARE THAT SHE WAS SUPPOSE TO DO PHYSICAL THERAPY

## 2024-03-13 RX ORDER — SERTRALINE HYDROCHLORIDE 25 MG/1
25 TABLET, FILM COATED ORAL DAILY
Qty: 30 TABLET | Refills: 0 | Status: SHIPPED | OUTPATIENT
Start: 2024-03-13

## 2024-03-13 NOTE — TELEPHONE ENCOUNTER
LM for patient to call back to discuss refill for Zoloft. Advised her PCP needs to manage this medication in the future. Advised we can send another refill if needed until she gets to her PCP to discuss.

## 2024-03-13 NOTE — TELEPHONE ENCOUNTER
The pt is asking for a refill on Zoloft to get her by until she see's her PCP. The pt states that she has an apt to she her PCP but it almost a month away. The pt will discuss the PCP taking over the script at the apt.

## 2024-06-13 ENCOUNTER — HOSPITAL ENCOUNTER (OUTPATIENT)
Dept: OCCUPATIONAL THERAPY | Facility: HOSPITAL | Age: 69
Setting detail: THERAPIES SERIES
Discharge: HOME | End: 2024-06-13
Payer: COMMERCIAL

## 2024-06-13 DIAGNOSIS — C50.811 MALIGNANT NEOPLASM OF OVERLAPPING SITES OF RIGHT BREAST IN FEMALE, ESTROGEN RECEPTOR POSITIVE: ICD-10-CM

## 2024-06-13 DIAGNOSIS — Z17.0 MALIGNANT NEOPLASM OF UPPER-OUTER QUADRANT OF RIGHT BREAST IN FEMALE, ESTROGEN RECEPTOR POSITIVE: ICD-10-CM

## 2024-06-13 DIAGNOSIS — Z91.89 AT RISK FOR LYMPHEDEMA: Primary | ICD-10-CM

## 2024-06-13 DIAGNOSIS — Z17.0 MALIGNANT NEOPLASM OF OVERLAPPING SITES OF RIGHT BREAST IN FEMALE, ESTROGEN RECEPTOR POSITIVE: ICD-10-CM

## 2024-06-13 DIAGNOSIS — C50.411 MALIGNANT NEOPLASM OF UPPER-OUTER QUADRANT OF RIGHT BREAST IN FEMALE, ESTROGEN RECEPTOR POSITIVE: ICD-10-CM

## 2024-06-13 PROCEDURE — 97535 SELF CARE MNGMENT TRAINING: CPT

## 2024-06-13 PROCEDURE — 93702 BIS XTRACELL FLUID ANALYSIS: CPT

## 2024-06-13 NOTE — THERAPY RE-EVALUATION
Outpatient Occupational Therapy Lymphedema Re-Evaluation   Jesu     Patient Name: Arely Huerta  : 1955  MRN: [id number]  Today's Date: 2024      Visit Date: 2024    Patient Active Problem List   Diagnosis    Malignant neoplasm of overlapping sites of right breast in female, estrogen receptor positive    Allergic rhinitis    Ankylosing spondylitis    Arthritis    Asthma    Benign essential HTN    Bladder disorder    Breast lump    COPD (chronic obstructive pulmonary disease)    High blood pressure    PVD (peripheral vascular disease)    High cholesterol    Hypothyroidism    Leukopenia    Mitral valve prolapse    Neurologic disorder    Primary hypercoagulable state    Rectal bleeding    Shortness of breath    Disproportion between native breast and reconstructed breast    Osteopenia of multiple sites    Breast pain, right        Past Medical History:   Diagnosis Date    Acid reflux     Allergies     Ankylosing spondylitis     Breast cancer     Hypercholesteremia     Hypertension     Skin cancer     Thyroid condition         Past Surgical History:   Procedure Laterality Date    APPENDECTOMY      BREAST BIOPSY Right 2023    Procedure: RIGHT BREAST LUMPECTOMY WITH NEEDLE LOCALIZATION;  Surgeon: Laura Goff MD;  [place] [facility] [place] [facility] [place]  Service: General;  Laterality: Right;    BREAST RECONSTRUCTION Bilateral 2023    Procedure: Bilateral breast reconstruction, left breast reduction, right breast oncoplastic closure with mastopexy;  Surgeon: Caren Martins MD;  [place] [facility] [place] [facility] [place]  Service: Plastics;  Laterality: Bilateral;    CORNEAL TRANSPLANT Right     FOOT SURGERY      SINUS SURGERY      x6    TUBAL ABDOMINAL LIGATION           Visit Dx:     ICD-10-CM ICD-9-CM   1. At risk for lymphedema  Z91.89 V49.89   2. Malignant neoplasm of upper-outer quadrant of right breast in female, estrogen receptor positive  C50.411 174.4    Z17.0 V86.0   3. Malignant neoplasm of  overlapping sites of right breast in female, estrogen receptor positive  C50.811 174.8    Z17.0 V86.0        Patient History       Row Name 06/13/24 1200             History    Chief Complaint Other 1 (comment)  Lymphedema surveillance program  -SF      Brief Description of Current Complaint Arely is a 68 y.o. female with a diagnosis of invasive ductal carcinoma ER/GA + HER2- of the right breast.  Pt underwent a lumpectomy on 6/20/23.  -SF         Fall Risk Assessment    Any falls in the past year: No  -SF      Does patient have a fear of falling No  -SF         Services    Are you currently receiving Home Health services No  -SF      Do you plan to receive Home Health services in the near future No  -SF         Daily Activities    Primary Language English  -SF      Are you able to read Yes  -SF      Are you able to write Yes  -SF      How does patient learn best? Listening;Reading;Demonstration;Pictures/Video  -SF      Pt Participated in POC and Goals Yes  -SF         Safety    Are you being hurt, hit, or frightened by anyone at home or in your life? No  -SF      Are you being neglected by a caregiver No  -SF      Have you had any of the following issues with N/A  -SF                User Key  (r) = Recorded By, (t) = Taken By, (c) = Cosigned By      Initials Name Provider Type    SF Rosa Mi OT Occupational Therapist                     Lymphedema       Row Name 06/13/24 1100             Subjective Pain    Able to rate subjective pain? yes  -SF      Pre-Treatment Pain Level 5  -SF      Post-Treatment Pain Level 5  -SF         Subjective    Subjective Comments Patient reports she has experienced an exaerbation with her Ankylosing spondylitis. Patient reports she has been taking prednisone. Patient reports no symptoms associated with lymphedema.  -SF         Lymphedema Assessment    Lymphedema Classification RUE:;at risk/stage 0  -SF      Lymphedema Cancer Related Sx right;lumpectomy;simple mastectomy  -SF       "Lymphedema Surgery Comments 6/20/2023  -SF      Lymph Nodes Removed # 2  -SF      Positive Lymph Nodes # 0  -SF      Chemo Received no  -SF      Radiation Therapy Received yes  -SF      Radiation Treatments #/Timeframe 25  -SF         LLIS - Physical Concerns    The amount of pain associated with my lymphedema is: 0  -SF      The amount of limb heaviness associated with my lymphedema is: 0  -SF      The amount of skin tightness associated with my lymphedema is: 0  -SF      The size of my swollen limb(s) seems: 0  -SF      Lymphedema affects the movement of my swollen limb(s): 0  -SF      The strength in my swollen limb(s) is: 0  -SF         LLIS - Psychosocial Concerns    Lymphedema affects my body image (i.e., \"how I think I look\"). 0  -SF      Lymphedema affects my socializing with others. 0  -SF      Lymphedema affects my intimate relations with spouse or partner (rate 0 if not applicable 0  -SF      Lymphedema \"gets me down\" (i.e., depression, frustration, or anger) 0  -SF      I must rely on others for help due to my lymphedema. 0  -SF      I know what to do to manage my lymphedema 2  -SF         LLIS - Functional Concerns    Lymphedema affects my ability to perform self-care activities (i.e. eating, dressing, hygiene) 0  -SF      Lymphedema affects my ability to perform routine home or work-related activities. 0  -SF      Lymphedema affects my performance of preferred leisure activities. 0  -SF      Lymphedema affects proper fit of clothing/shoes 0  -SF      Lymphedema affects my sleep 0  -SF         Posture/Observations    Posture- WNL Posture is WNL  -SF         General ROM    GENERAL ROM COMMENTS BUE WFL  -SF         MMT (Manual Muscle Testing)    General MMT Comments BUE WFL  -SF         Lymphedema Measurements    Measurement Type(s) Volumetric  -SF      Volumetric Areas Upper extremities  -SF         L-Dex Bioimpedence Screening    L-Dex Measurement Extremity RUE  -SF      L-Dex Patient Position Standing  " -SF      L-Dex UE Dominate Side Left  -SF      L-Dex UE At Risk Side Right  -SF      L-Dex UE Pre Surgical Value Yes  -SF      L-Dex UE Score 10.4  -SF      L-Dex UE Baseline Score -0.9  -SF      L-Dex UE Value Change 11.3  -SF      $ L-Dex Charge yes  -SF         Lymphedema Life Impact Scale Totals    A.  Total Q1 - Q17 (Do not include Q18) 2  -SF      B.  Total number of questions answered (Q1-Q17) 17  -SF      C. Divide A by B 0.12  -SF      D. Multiple C by 25 3  -SF                User Key  (r) = Recorded By, (t) = Taken By, (c) = Cosigned By      Initials Name Provider Type    Rosa Azevedo OT Occupational Therapist                            Therapy Education  56426 - OT Self Care/Mgmt Minutes: 25         OT Goals       Row Name 06/13/24 1244          Time Calculation    OT Goal Re-Cert Due Date 07/13/24  -SF               User Key  (r) = Recorded By, (t) = Taken By, (c) = Cosigned By      Initials Name Provider Type    Rosa Azevedo OT Occupational Therapist                  GOALS:  1. Post Breast Surgery Care/at risk for Lymphedema  LTG 1: 90 days:  As an indicator of no exacerbation of lymphedema staging, the patient will present with an L-Dex score less than [10] points from preoperative baseline.              STATUS: on going  STG 1a:   30 days: To prevent exacerbation of mixed edema to lymphedema, patient will utilize the 2 postsurgical compression garments daily.                 STATUS: on going  STG 1b: 30 days: Patient will be independent with self-manual lymphatic massage.               STATUS: on going  STG 1c: 30 days:  Patient will be independent with identification of signs and symptoms of lymphedema exasperation per stoplight to recovery education handout.              STATUS: on going  STG 1 d: 30 days: Patient will be independent with HEP to prevent advancement in lymphedema staging.              STATUS: on going  TREATMENT:  Self Care/ADL retraining, Therapeutic Activity, Neuromuscular  Re-education, Therapeutic Exercise, Bioimpedence Fluid Analysis, Post-Surgical compression garement [address] [facility]/ Carla Camisole Kit 2860K, Orthotic Management and training,  and Manual Therapy.        OT Assessment/Plan       Row Name 06/13/24 1240          OT Assessment    Functional Limitations Limitations in functional capacity and performance  -SF     Impairments Impaired lymphatic circulation  -SF     Assessment Comments Arely is a 68-year-old female with diagnosis of invasive ductal carcinoma ER/TX+ HER2-of the right breast.  Patient underwent a lumpectomy 6/20/2023.  Patient is demonstrating an increase in L-Dex measurements on this date however reports an exacerbation of her Ankylosing spondylitis. L-dex measurements may be skewed due to this inflammation. Patient reporting compliance w/ HEP and self MLD. Therapist recommending patient also utilize her compression sleeve at this time.  Patient will continue to benefit from skilled occupational therapy to further evaluate ongoing lymphatic functioning to prevent advancement in lymphedema staging.  -SF     OT Diagnosis At risk for lymphedema  -SF     OT Rehab Potential Good  -SF     Patient/caregiver participated in establishment of treatment plan and goals Yes  -SF     Patient would benefit from skilled therapy intervention Yes  -SF        OT Plan    OT Frequency Other (comment)  -SF     Predicted Duration of Therapy Intervention (OT) 1 month follow up to further assess  -SF     Planned CPT's? OT EVAL LOW COMPLEXITY: 01859;OT RE-EVAL: 65401;OT THER ACT EA 15 MIN: 39041XC;OT SELF CARE/MGMT/TRAIN 15 MIN: 05092;OT MANUAL THERAPY EA 15 MIN: 94565;OT BIS XTRACELL FLUID ANALYSIS: 12408;OT CARE PLAN EA 15 MIN;OT ORTHOTIC MGMT/TRAIN EA 15 MIN: 66961;OT ORTHO/PROSTHET CHECKOUT EA 15 MIN: 35571  -SF     Planned Therapy Interventions (Optional Details) home exercise program;manual therapy techniques;stretching;strengthening;ROM (Range of Motion);prosthetic  fitting/training;patient/family education;orthotic fitting/training  -SF     OT Plan Comments Continue POC  -SF               User Key  (r) = Recorded By, (t) = Taken By, (c) = Cosigned By      Initials Name Provider Type    SF Rosa Mi OT Occupational Therapist                              Time Calculation:   Timed Charges  91627 - OT Self Care/Mgmt Minutes: 25  Total Minutes  Timed Charges Total Minutes: 25   Total Minutes: 25     Therapy Charges for Today       Code Description Service Date Service Provider Modifiers Qty    92654766114 HC PT BIS XTRACELL FLUID ANALYSIS 6/13/2024 Rosa Mi OT  1    [id number]  OT SELF CARE/MGMT/TRAIN EA 15 MIN 6/13/2024 Rosa Mi OT GO 2                      Rosa Mi OT  6/13/2024

## 2024-06-25 ENCOUNTER — TELEPHONE (OUTPATIENT)
Dept: PLASTIC SURGERY | Facility: CLINIC | Age: 69
End: 2024-06-25

## 2024-06-25 NOTE — TELEPHONE ENCOUNTER
Caller: Arely Huerta    Relationship to patient: Self    Best call back number: [phone] (home)       Patient is needing: SAME DAY CANCEL, PATIENT IS HAVING A PAIN FLARE UP FROM ANKYLOSING SPONDYLITIS. SHE WILL CALL BACK TO RESCHEDULE WHEN SHE FEELS BETTER

## 2024-07-11 ENCOUNTER — HOSPITAL ENCOUNTER (OUTPATIENT)
Dept: OCCUPATIONAL THERAPY | Facility: HOSPITAL | Age: 69
Setting detail: THERAPIES SERIES
End: 2024-07-11
Payer: COMMERCIAL

## 2024-07-11 DIAGNOSIS — Z91.89 AT RISK FOR LYMPHEDEMA: Primary | ICD-10-CM

## 2024-07-11 DIAGNOSIS — Z17.0 MALIGNANT NEOPLASM OF OVERLAPPING SITES OF RIGHT BREAST IN FEMALE, ESTROGEN RECEPTOR POSITIVE: ICD-10-CM

## 2024-07-11 DIAGNOSIS — C50.811 MALIGNANT NEOPLASM OF OVERLAPPING SITES OF RIGHT BREAST IN FEMALE, ESTROGEN RECEPTOR POSITIVE: ICD-10-CM

## 2024-07-11 DIAGNOSIS — C50.411 MALIGNANT NEOPLASM OF UPPER-OUTER QUADRANT OF RIGHT BREAST IN FEMALE, ESTROGEN RECEPTOR POSITIVE: ICD-10-CM

## 2024-07-11 DIAGNOSIS — Z17.0 MALIGNANT NEOPLASM OF UPPER-OUTER QUADRANT OF RIGHT BREAST IN FEMALE, ESTROGEN RECEPTOR POSITIVE: ICD-10-CM

## 2024-07-26 ENCOUNTER — TELEPHONE (OUTPATIENT)
Dept: ONCOLOGY | Facility: HOSPITAL | Age: 69
End: 2024-07-26

## 2024-07-26 DIAGNOSIS — Z12.31 ENCOUNTER FOR SCREENING MAMMOGRAM FOR MALIGNANT NEOPLASM OF BREAST: Primary | ICD-10-CM

## 2024-07-26 NOTE — TELEPHONE ENCOUNTER
Caller: Arely Huerta    Relationship: Self    Best call back number: [phone]    What is the best time to reach you: ANYTIME    Who are you requesting to speak with (clinical staff, provider,  specific staff member): SCHEDULING    What was the call regarding: PATIENT IS DUE FOR MAMMOGRAM AND F/U APPT.     PLEASE CALL TO SCHEDULE.

## 2024-08-07 ENCOUNTER — HOSPITAL ENCOUNTER (OUTPATIENT)
Dept: OCCUPATIONAL THERAPY | Facility: HOSPITAL | Age: 69
Setting detail: THERAPIES SERIES
Discharge: HOME | End: 2024-08-07
Payer: COMMERCIAL

## 2024-08-07 DIAGNOSIS — C50.411 MALIGNANT NEOPLASM OF UPPER-OUTER QUADRANT OF RIGHT BREAST IN FEMALE, ESTROGEN RECEPTOR POSITIVE: ICD-10-CM

## 2024-08-07 DIAGNOSIS — Z91.89 AT RISK FOR LYMPHEDEMA: Primary | ICD-10-CM

## 2024-08-07 DIAGNOSIS — Z17.0 MALIGNANT NEOPLASM OF UPPER-OUTER QUADRANT OF RIGHT BREAST IN FEMALE, ESTROGEN RECEPTOR POSITIVE: ICD-10-CM

## 2024-08-07 DIAGNOSIS — C50.811 MALIGNANT NEOPLASM OF OVERLAPPING SITES OF RIGHT BREAST IN FEMALE, ESTROGEN RECEPTOR POSITIVE: ICD-10-CM

## 2024-08-07 DIAGNOSIS — Z17.0 MALIGNANT NEOPLASM OF OVERLAPPING SITES OF RIGHT BREAST IN FEMALE, ESTROGEN RECEPTOR POSITIVE: ICD-10-CM

## 2024-08-07 PROCEDURE — 97535 SELF CARE MNGMENT TRAINING: CPT

## 2024-08-07 PROCEDURE — 93702 BIS XTRACELL FLUID ANALYSIS: CPT

## 2024-08-07 NOTE — THERAPY RE-EVALUATION
Outpatient Occupational Therapy Lymphedema Re-Evaluation   Jesu     Patient Name: Arely Huerta  : 1955  MRN: [id number]  Today's Date: 2024      Visit Date: 2024    Patient Active Problem List   Diagnosis    Malignant neoplasm of overlapping sites of right breast in female, estrogen receptor positive    Allergic rhinitis    Ankylosing spondylitis    Arthritis    Asthma    Benign essential HTN    Bladder disorder    Breast lump    COPD (chronic obstructive pulmonary disease)    High blood pressure    PVD (peripheral vascular disease)    High cholesterol    Hypothyroidism    Leukopenia    Mitral valve prolapse    Neurologic disorder    Primary hypercoagulable state    Rectal bleeding    Shortness of breath    Disproportion between native breast and reconstructed breast    Osteopenia of multiple sites    Breast pain, right        Past Medical History:   Diagnosis Date    Acid reflux     Allergies     Ankylosing spondylitis     Breast cancer     Hypercholesteremia     Hypertension     Skin cancer     Thyroid condition         Past Surgical History:   Procedure Laterality Date    APPENDECTOMY      BREAST BIOPSY Right 2023    Procedure: RIGHT BREAST LUMPECTOMY WITH NEEDLE LOCALIZATION;  Surgeon: Laura Goff MD;  Location: [facility] OR [facility];  Service: General;  Laterality: Right;    BREAST RECONSTRUCTION Bilateral 2023    Procedure: Bilateral breast reconstruction, left breast reduction, right breast oncoplastic closure with mastopexy;  Surgeon: Caren Martins MD;  Location: [facility];  Service: Plastics;  Laterality: Bilateral;    CORNEAL TRANSPLANT Right     FOOT SURGERY      SINUS SURGERY      x6    TUBAL ABDOMINAL LIGATION           Visit Dx:     ICD-10-CM ICD-9-CM   1. At risk for lymphedema  Z91.89 V49.89   2. Malignant neoplasm of upper-outer quadrant of right breast in female, estrogen receptor positive  C50.411 174.4    Z17.0 V86.0   3. Malignant neoplasm of  overlapping sites of right breast in female, estrogen receptor positive  C50.811 174.8    Z17.0 V86.0        Patient History       Row Name 08/07/24 1200             History    Chief Complaint Other 1 (comment)  Lymphedema surveillance program  -SF      Brief Description of Current Complaint Arely is a 68 y.o. female with a diagnosis of invasive ductal carcinoma ER/ND + HER2- of the right breast.  Pt underwent a lumpectomy on 6/20/23.  -SF         Fall Risk Assessment    Any falls in the past year: No  -SF      Does patient have a fear of falling No  -SF         Services    Are you currently receiving Home Health services No  -SF      Do you plan to receive Home Health services in the near future No  -SF         Daily Activities    Primary Language English  -SF      Are you able to read Yes  -SF      Are you able to write Yes  -SF      How does patient learn best? Listening;Reading;Demonstration;Pictures/Video  -SF      Pt Participated in POC and Goals Yes  -SF         Safety    Are you being hurt, hit, or frightened by anyone at home or in your life? No  -SF      Are you being neglected by a caregiver No  -SF      Have you had any of the following issues with N/A  -SF                User Key  (r) = Recorded By, (t) = Taken By, (c) = Cosigned By      Initials Name Provider Type    SF Rosa Mi OT Occupational Therapist                     Lymphedema       Row Name 08/07/24 1300             Subjective Pain    Able to rate subjective pain? yes  -SF      Pre-Treatment Pain Level 0  -SF      Post-Treatment Pain Level 0  -SF      Subjective Pain Comment Denies pain in breast  -SF         Subjective    Subjective Comments Patient reports she will use compression sleeve occasionally when her arm feels heavy.  -SF         Lymphedema Assessment    Lymphedema Classification RUE:;at risk/stage 0  -SF      Lymphedema Cancer Related Sx right;lumpectomy;simple mastectomy  -SF      Lymphedema Surgery Comments 6/20/2023  -SF    "   Lymph Nodes Removed # 2  -SF      Positive Lymph Nodes # 0  -SF      Chemo Received no  -SF      Radiation Therapy Received yes  -SF      Radiation Treatments #/Timeframe 25  -SF         LLIS - Physical Concerns    The amount of pain associated with my lymphedema is: 0  -SF      The amount of limb heaviness associated with my lymphedema is: 0  -SF      The amount of skin tightness associated with my lymphedema is: 0  -SF      The size of my swollen limb(s) seems: 0  -SF      Lymphedema affects the movement of my swollen limb(s): 0  -SF      The strength in my swollen limb(s) is: 0  -SF         LLIS - Psychosocial Concerns    Lymphedema affects my body image (i.e., \"how I think I look\"). 0  -SF      Lymphedema affects my socializing with others. 0  -SF      Lymphedema affects my intimate relations with spouse or partner (rate 0 if not applicable 0  -SF      Lymphedema \"gets me down\" (i.e., depression, frustration, or anger) 0  -SF      I must rely on others for help due to my lymphedema. 0  -SF      I know what to do to manage my lymphedema 2  -SF         LLIS - Functional Concerns    Lymphedema affects my ability to perform self-care activities (i.e. eating, dressing, hygiene) 0  -SF      Lymphedema affects my ability to perform routine home or work-related activities. 0  -SF      Lymphedema affects my performance of preferred leisure activities. 0  -SF      Lymphedema affects proper fit of clothing/shoes 0  -SF      Lymphedema affects my sleep 0  -SF         Posture/Observations    Posture- WNL Posture is WNL  -SF         General ROM    GENERAL ROM COMMENTS BUE WFL  -SF         MMT (Manual Muscle Testing)    General MMT Comments BUE WFL  -SF         Skin Changes/Observations    Location/Assessment Upper Quadrant  -SF      Upper Quadrant Conditions bilateral:;normal;intact  -SF      Upper Quadrant Color/Pigment bilateral:;fibrosis  -SF      Skin Observations Comment Patient presenting with large bruise on LUE. " Patient reporting her cat scratched her and caused the bruising. Therapist reviewing signs and symptoms of infection with instruction to remove compression sleeve if she notices infection. Patient was educated to contact physician as she will need to be on antibiotics for infection  for 72 hours before she can continue wearing compression. Patient does not present with infection on this date.  -SF         Lymphedema Measurements    Measurement Type(s) Volumetric  -SF      Volumetric Areas Upper extremities  -SF         L-Dex Bioimpedence Screening    L-Dex Measurement Extremity RUE  -SF      L-Dex Patient Position Standing  -SF      L-Dex UE Dominate Side Left  -SF      L-Dex UE At Risk Side Right  -SF      L-Dex UE Pre Surgical Value Yes  -SF      L-Dex UE Score 3.9  -SF      L-Dex UE Baseline Score -0.9  -SF      L-Dex UE Value Change 4.8  -SF      $ L-Dex Charge yes  -SF         Lymphedema Life Impact Scale Totals    A.  Total Q1 - Q17 (Do not include Q18) 2  -SF      B.  Total number of questions answered (Q1-Q17) 17  -SF      C. Divide A by B 0.12  -SF      D. Multiple C by 25 3  -SF                User Key  (r) = Recorded By, (t) = Taken By, (c) = Cosigned By      Initials Name Provider Type    SF Rosa Mi, OT Occupational Therapist                    Bioimpedance spectroscopy helps identify the onset of lymphedema in an arm or leg before patients experience noticeable swelling. Research has shown that 92% of patients with early detection of lymphedema using L-Dex combined with intervention do not progress to chronic lymphedema through three years. Additionally, as of March 2023, the NCCN Guidelines® for Survivorship recommend proactive screening for lymphedema using bioimpedance spectroscopy. Whenever possible, patients are tested for baseline L-Dex score before   cancer treatment begins and then are reassessed during regular follow-up visits using the SOZO device. Otherwise, this can be started  postoperatively and continued during regular follow-up visits. If the patient's L-Dex score increases above normal levels, that is a sign that lymphedema is developing and a referral is made to occupational therapy for further evaluation and early compression treatment. Lymphedema assessment with the SOZO L-Dex score is recommended to be done every 3 months for the first 3 years and then every 6 months for years 4 and 5 followed by annually afterwards        Therapy Education  67367 - OT Self Care/Mgmt Minutes: 20         OT Goals       Row Name 08/07/24 1345          Time Calculation    OT Goal Re-Cert Due Date 09/15/24  -               User Key  (r) = Recorded By, (t) = Taken By, (c) = Cosigned By      Initials Name Provider Type    Rosa Azevedo OT Occupational Therapist                  GOALS:  1. Post Breast Surgery Care/at risk for Lymphedema  LTG 1: 90 days:  As an indicator of no exacerbation of lymphedema staging, the patient will present with an L-Dex score less than [10] points from preoperative baseline.              STATUS: met, on going  STG 1a:   30 days: To prevent exacerbation of mixed edema to lymphedema, patient will utilize the 2 postsurgical compression garments daily.                 STATUS: on going  STG 1b: 30 days: Patient will be independent with self-manual lymphatic massage.               STATUS: on going  STG 1c: 30 days:  Patient will be independent with identification of signs and symptoms of lymphedema exasperation per stoplight to recovery education handout.              STATUS: on going  STG 1 d: 30 days: Patient will be independent with HEP to prevent advancement in lymphedema staging.              STATUS: on going  TREATMENT:  Self Care/ADL retraining, Therapeutic Activity, Neuromuscular Re-education, Therapeutic Exercise, Bioimpedence Fluid Analysis, Post-Surgical compression garement [facility] [employer] [facility] 2860K, Orthotic Management and training,  and  Manual Therapy   OT Assessment/Plan       Row Name 08/07/24 1341          OT Assessment    Functional Limitations Limitations in functional capacity and performance  -SF     Impairments Impaired lymphatic circulation  -SF     Assessment Comments Arely is a 68-year-old female with diagnosis of invasive ductal carcinoma ER/AR+ HER2-of the right breast.  Patient underwent a lumpectomy on 6/20/2023.  Patient is demonstrating normalized lymphatic functioning at this time.  Patient will continue to benefit from skilled occupational therapy to further evaluate ongoing lymphatic functioning to prevent advancement in lymphedema staging, increased pain and decreased range of motion.  -SF     OT Diagnosis At risk for lymphedema  -SF     OT Rehab Potential Good  -SF     Patient/caregiver participated in establishment of treatment plan and goals Yes  -SF     Patient would benefit from skilled therapy intervention Yes  -SF        OT Plan    OT Frequency Other (comment)  -SF     Predicted Duration of Therapy Intervention (OT) Patient will be reevaluated every 3 months years 1 through 3 every, 6 months years 4 and 5  -SF     Planned CPT's? OT EVAL LOW COMPLEXITY: 64753;OT RE-EVAL: 83128;OT THER ACT EA 15 MIN: 72188DV;OT SELF CARE/MGMT/TRAIN 15 MIN: 90279;OT MANUAL THERAPY EA 15 MIN: 45072;OT BIS XTRACELL FLUID ANALYSIS: 63404;OT CARE PLAN EA 15 MIN;OT ORTHOTIC MGMT/TRAIN EA 15 MIN: 69075;OT ORTHO/PROSTHET CHECKOUT EA 15 MIN: 54027  -SF     Planned Therapy Interventions (Optional Details) home exercise program;manual therapy techniques;stretching;strengthening;ROM (Range of Motion);prosthetic fitting/training;patient/family education;orthotic fitting/training  -SF     OT Plan Comments Continue POC  -SF               User Key  (r) = Recorded By, (t) = Taken By, (c) = Cosigned By      Initials Name Provider Type    SF Rosa Mi OT Occupational Therapist                              Time Calculation:   Timed Charges  62109 - OT Self  Care/Mgmt Minutes: 20  Total Minutes  Timed Charges Total Minutes: 20   Total Minutes: 20     Therapy Charges for Today       Code Description Service Date Service Provider Modifiers Qty    78744281240 HC PT BIS XTRACELL FLUID ANALYSIS 8/7/2024 Rosa Mi OT  1    02152151893 HC OT SELF CARE/MGMT/TRAIN EA 15 MIN 8/7/2024 Rosa Mi OT GO 1                      Rosa Mi OT  8/7/2024

## 2024-08-26 DIAGNOSIS — Z17.0 MALIGNANT NEOPLASM OF OVERLAPPING SITES OF RIGHT BREAST IN FEMALE, ESTROGEN RECEPTOR POSITIVE: ICD-10-CM

## 2024-08-26 DIAGNOSIS — C50.811 MALIGNANT NEOPLASM OF OVERLAPPING SITES OF RIGHT BREAST IN FEMALE, ESTROGEN RECEPTOR POSITIVE: ICD-10-CM

## 2024-08-26 RX ORDER — ANASTROZOLE 1 MG/1
1 TABLET ORAL DAILY
Qty: 90 TABLET | Refills: 0 | OUTPATIENT
Start: 2024-08-26

## 2024-08-27 DIAGNOSIS — C50.811 MALIGNANT NEOPLASM OF OVERLAPPING SITES OF RIGHT BREAST IN FEMALE, ESTROGEN RECEPTOR POSITIVE: ICD-10-CM

## 2024-08-27 DIAGNOSIS — Z17.0 MALIGNANT NEOPLASM OF OVERLAPPING SITES OF RIGHT BREAST IN FEMALE, ESTROGEN RECEPTOR POSITIVE: ICD-10-CM

## 2024-08-27 NOTE — TELEPHONE ENCOUNTER
The pt called and states that her son had brain sx (tumor removed) in [place] and that she went down to care for him. The pt states that she will be there indefinitely. The pt states that she is on her last Anastrozole pill and needs a refill sent to [place]. Please review and sign script.     [facility]   [address]  [place], [place] [address]

## 2024-08-28 RX ORDER — ANASTROZOLE 1 MG/1
1 TABLET ORAL DAILY
Qty: 90 TABLET | Refills: 2 | Status: SHIPPED | OUTPATIENT
Start: 2024-08-28

## 2024-10-02 ENCOUNTER — TELEPHONE (OUTPATIENT)
Dept: ONCOLOGY | Facility: HOSPITAL | Age: 69
End: 2024-10-02
Payer: COMMERCIAL

## 2024-10-02 NOTE — TELEPHONE ENCOUNTER
Caller: Arely Huerta    Relationship to patient: Self    Best call back number: [phone]     Chief complaint: PT NEEDS TO R/S     Type of visit: FOLLOW UP    Requested date: AFTER THE FIRST WEEK OF NOVEMBER     If rescheduling, when is the original appointment: 08/13/24

## 2025-01-13 ENCOUNTER — TELEPHONE (OUTPATIENT)
Dept: ONCOLOGY | Facility: HOSPITAL | Age: 70
End: 2025-01-13
Payer: COMMERCIAL

## 2025-01-17 ENCOUNTER — TELEPHONE (OUTPATIENT)
Facility: HOSPITAL | Age: 70
End: 2025-01-17
Payer: COMMERCIAL

## 2025-01-17 DIAGNOSIS — Z91.89 AT RISK FOR LYMPHEDEMA: ICD-10-CM

## 2025-01-17 DIAGNOSIS — Z17.0 MALIGNANT NEOPLASM OF OVERLAPPING SITES OF RIGHT BREAST IN FEMALE, ESTROGEN RECEPTOR POSITIVE: Primary | ICD-10-CM

## 2025-01-17 DIAGNOSIS — C50.811 MALIGNANT NEOPLASM OF OVERLAPPING SITES OF RIGHT BREAST IN FEMALE, ESTROGEN RECEPTOR POSITIVE: Primary | ICD-10-CM

## 2025-01-22 ENCOUNTER — TRANSCRIBE ORDERS (OUTPATIENT)
Dept: ADMINISTRATIVE | Facility: HOSPITAL | Age: 70
End: 2025-01-22
Payer: COMMERCIAL

## 2025-01-22 DIAGNOSIS — M05.79 SEROPOSITIVE RHEUMATOID ARTHRITIS OF MULTIPLE SITES: Primary | ICD-10-CM

## 2025-01-22 DIAGNOSIS — M06.4 INFLAMMATORY POLYARTHROPATHY: ICD-10-CM

## 2025-01-22 DIAGNOSIS — M45.7 ANKYLOSING SPONDYLITIS OF LUMBOSACRAL REGION: ICD-10-CM

## 2025-01-22 DIAGNOSIS — M81.0 SENILE OSTEOPOROSIS: ICD-10-CM

## 2025-05-28 DIAGNOSIS — C50.811 MALIGNANT NEOPLASM OF OVERLAPPING SITES OF RIGHT BREAST IN FEMALE, ESTROGEN RECEPTOR POSITIVE: ICD-10-CM

## 2025-05-28 DIAGNOSIS — Z17.0 MALIGNANT NEOPLASM OF OVERLAPPING SITES OF RIGHT BREAST IN FEMALE, ESTROGEN RECEPTOR POSITIVE: ICD-10-CM

## 2025-06-06 RX ORDER — ANASTROZOLE 1 MG/1
1 TABLET ORAL DAILY
Qty: 90 TABLET | Refills: 0 | OUTPATIENT
Start: 2025-06-06

## 2025-06-11 DIAGNOSIS — C50.811 MALIGNANT NEOPLASM OF OVERLAPPING SITES OF RIGHT BREAST IN FEMALE, ESTROGEN RECEPTOR POSITIVE: ICD-10-CM

## 2025-06-11 DIAGNOSIS — Z17.0 MALIGNANT NEOPLASM OF OVERLAPPING SITES OF RIGHT BREAST IN FEMALE, ESTROGEN RECEPTOR POSITIVE: ICD-10-CM

## 2025-06-11 NOTE — TELEPHONE ENCOUNTER
Caller: Arely Huerta    Relationship: Self    Best call back number: [phone]    Requested Prescriptions:   Requested Prescriptions     Pending Prescriptions Disp Refills    anastrozole (ARIMIDEX) 1 MG tablet 90 tablet 2     Sig: Take 1 tablet by mouth Daily.        Pharmacy where request should be sent: [facility] [address] [place] [address] [phone] [facility] [phone]      Last office visit with prescribing clinician: 8/28/2023   Last telemedicine visit with prescribing clinician: Visit date not found   Next office visit with prescribing clinician: Visit date not found       Does the patient have less than a 3 day supply:  [x] Yes  [] No      Rachel Brito Rep   06/11/25 09:14 EDT

## 2025-06-12 RX ORDER — ANASTROZOLE 1 MG/1
1 TABLET ORAL DAILY
Qty: 90 TABLET | Refills: 2 | Status: SHIPPED | OUTPATIENT
Start: 2025-06-12

## 2025-08-25 ENCOUNTER — HOSPITAL ENCOUNTER (OUTPATIENT)
Dept: BONE DENSITY | Facility: HOSPITAL | Age: 70
Discharge: HOME | End: 2025-08-25
Payer: MEDICARE

## (undated) DEVICE — GOWN,REINFRCE,POLY,SIRUS,BREATH SLV,XXLG: Brand: MEDLINE

## (undated) DEVICE — MARKER,SKIN,WI/RULER AND LABELS: Brand: MEDLINE

## (undated) DEVICE — PENCL E/S SMOKEEVAC W/TELESCP CANN

## (undated) DEVICE — STPLR SKIN SUBCUTICULAR INSORB 2030

## (undated) DEVICE — PLASTIC MAJOR-LF: Brand: MEDLINE INDUSTRIES, INC.

## (undated) DEVICE — MAJOR-LF: Brand: MEDLINE INDUSTRIES, INC.

## (undated) DEVICE — ADHS SKIN PREMIERPRO EXOFIN TOPICAL HI/VISC .5ML

## (undated) DEVICE — SLV SCD KN/LEN ADJ EXPRSS BLENDED MD 1P/U

## (undated) DEVICE — GLOVE,SURG,SENSICARE SLT,LF,PF,5.5: Brand: MEDLINE

## (undated) DEVICE — SOL IRR NACL 0.9PCT BT 1000ML

## (undated) DEVICE — GLV SURG SENSICARE PI ORTHO SZ8 LF STRL

## (undated) DEVICE — STERILE POLYISOPRENE POWDER-FREE SURGICAL GLOVES WITH EMOLLIENT COATING: Brand: PROTEXIS

## (undated) DEVICE — PROXIMATE RH ROTATING HEAD SKIN STAPLERS (35 WIDE) CONTAINS 35 STAINLESS STEEL STAPLES: Brand: PROXIMATE

## (undated) DEVICE — ANTIBACTERIAL UNDYED BRAIDED (POLYGLACTIN 910), SYNTHETIC ABSORBABLE SUTURE: Brand: COATED VICRYL

## (undated) DEVICE — DRSNG SURG AQUACEL AG/ADVNTGE 9X15CM 3.5X6IN

## (undated) DEVICE — SUT VIC 3/0 SH 27IN J416H

## (undated) DEVICE — GOWN,REINFORCE,POLY,SIRUS,BREATH SLV,XLG: Brand: MEDLINE

## (undated) DEVICE — INTENDED FOR TISSUE SEPARATION, AND OTHER PROCEDURES THAT REQUIRE A SHARP SURGICAL BLADE TO PUNCTURE OR CUT.: Brand: BARD-PARKER ® CARBON RIB-BACK BLADES

## (undated) DEVICE — 450 ML BOTTLE OF 0.05% CHLORHEXIDINE GLUCONATE IN 99.95% STERILE WATER FOR IRRIGATION, USP AND APPLICATOR.: Brand: IRRISEPT ANTIMICROBIAL WOUND LAVAGE

## (undated) DEVICE — SUT PERMAHAND SILK 0 FSL 30IN BLK

## (undated) DEVICE — ADHS LIQ MASTISOL 2/3ML

## (undated) DEVICE — CVR HNDL LT SURG ACCSSRY BLU STRL

## (undated) DEVICE — GLV SURG SENSICARE PI PF LF 7 GRN STRL

## (undated) DEVICE — ELECTRD BLD EDGE/INSUL1P 2.4X5.1MM STRL

## (undated) DEVICE — STRIP CLS WND SUTURESTRIP/PLS 0.5X4IN TP1103

## (undated) DEVICE — GLOVE,SURG,SENSICARE SLT,LF,PF,6.5: Brand: MEDLINE